# Patient Record
Sex: FEMALE | Race: BLACK OR AFRICAN AMERICAN | NOT HISPANIC OR LATINO | Employment: OTHER | URBAN - METROPOLITAN AREA
[De-identification: names, ages, dates, MRNs, and addresses within clinical notes are randomized per-mention and may not be internally consistent; named-entity substitution may affect disease eponyms.]

---

## 2019-09-25 ENCOUNTER — HOSPITAL ENCOUNTER (INPATIENT)
Facility: HOSPITAL | Age: 84
LOS: 3 days | Discharge: HOME HEALTH | DRG: 482 | End: 2019-09-28
Attending: EMERGENCY MEDICINE | Admitting: INTERNAL MEDICINE
Payer: MEDICARE

## 2019-09-25 DIAGNOSIS — S72.001A CLOSED FRACTURE OF RIGHT HIP, INITIAL ENCOUNTER: Primary | ICD-10-CM

## 2019-09-25 DIAGNOSIS — S72.144A CLOSED NONDISPLACED INTERTROCHANTERIC FRACTURE OF RIGHT FEMUR, INITIAL ENCOUNTER: ICD-10-CM

## 2019-09-25 DIAGNOSIS — T14.90XA TRAUMA: ICD-10-CM

## 2019-09-25 DIAGNOSIS — Z01.818 PRE-OP EVALUATION: ICD-10-CM

## 2019-09-25 PROBLEM — Z72.0 TOBACCO ABUSE: Status: ACTIVE | Noted: 2019-09-25

## 2019-09-25 PROBLEM — S72.141A CLOSED INTERTROCHANTERIC FRACTURE OF RIGHT HIP: Status: ACTIVE | Noted: 2019-09-25

## 2019-09-25 PROBLEM — Z72.0 TOBACCO ABUSE: Chronic | Status: ACTIVE | Noted: 2019-09-25

## 2019-09-25 LAB
ALBUMIN SERPL-MCNC: 4.1 G/DL (ref 3.3–5.5)
ALP SERPL-CCNC: 60 U/L (ref 42–141)
BILIRUB SERPL-MCNC: 0.8 MG/DL (ref 0.2–1.6)
BILIRUBIN, POC UA: NEGATIVE
BLOOD, POC UA: ABNORMAL
BUN SERPL-MCNC: 13 MG/DL (ref 7–22)
CALCIUM SERPL-MCNC: 9.8 MG/DL (ref 8–10.3)
CHLORIDE SERPL-SCNC: 102 MMOL/L (ref 98–108)
CLARITY, POC UA: CLEAR
COLOR, POC UA: YELLOW
CREAT SERPL-MCNC: 0.6 MG/DL (ref 0.6–1.2)
GLUCOSE SERPL-MCNC: 188 MG/DL (ref 73–118)
GLUCOSE, POC UA: NEGATIVE
KETONES, POC UA: ABNORMAL
LEUKOCYTE EST, POC UA: ABNORMAL
NITRITE, POC UA: NEGATIVE
PH UR STRIP: 5 [PH]
POC ALT (SGPT): 16 U/L (ref 10–47)
POC AST (SGOT): 26 U/L (ref 11–38)
POC PTINR: 1 (ref 0.9–1.2)
POC PTWBT: 12.5 SEC (ref 9.7–14.3)
POC TCO2: 29 MMOL/L (ref 18–33)
POTASSIUM BLD-SCNC: 3.7 MMOL/L (ref 3.6–5.1)
PROTEIN, POC UA: NEGATIVE
PROTEIN, POC: 7.4 G/DL (ref 6.4–8.1)
SAMPLE: NORMAL
SODIUM BLD-SCNC: 144 MMOL/L (ref 128–145)
SPECIFIC GRAVITY, POC UA: >=1.03
UROBILINOGEN, POC UA: 0.2 E.U./DL

## 2019-09-25 PROCEDURE — 80053 COMPREHEN METABOLIC PANEL: CPT | Mod: ER

## 2019-09-25 PROCEDURE — 81003 URINALYSIS AUTO W/O SCOPE: CPT | Mod: ER

## 2019-09-25 PROCEDURE — 93010 ELECTROCARDIOGRAM REPORT: CPT | Mod: ,,, | Performed by: INTERNAL MEDICINE

## 2019-09-25 PROCEDURE — 93005 ELECTROCARDIOGRAM TRACING: CPT | Mod: ER

## 2019-09-25 PROCEDURE — 99285 EMERGENCY DEPT VISIT HI MDM: CPT | Mod: 25,ER

## 2019-09-25 PROCEDURE — 87086 URINE CULTURE/COLONY COUNT: CPT

## 2019-09-25 PROCEDURE — 51702 INSERT TEMP BLADDER CATH: CPT | Mod: ER

## 2019-09-25 PROCEDURE — 63600175 PHARM REV CODE 636 W HCPCS: Mod: ER | Performed by: EMERGENCY MEDICINE

## 2019-09-25 PROCEDURE — 85610 PROTHROMBIN TIME: CPT | Mod: ER

## 2019-09-25 PROCEDURE — 93010 EKG 12-LEAD: ICD-10-PCS | Mod: ,,, | Performed by: INTERNAL MEDICINE

## 2019-09-25 PROCEDURE — 96374 THER/PROPH/DIAG INJ IV PUSH: CPT | Mod: ER

## 2019-09-25 PROCEDURE — 85025 COMPLETE CBC W/AUTO DIFF WBC: CPT | Mod: ER

## 2019-09-25 PROCEDURE — 11000001 HC ACUTE MED/SURG PRIVATE ROOM

## 2019-09-25 PROCEDURE — 96375 TX/PRO/DX INJ NEW DRUG ADDON: CPT | Mod: ER

## 2019-09-25 RX ORDER — ACETAMINOPHEN 500 MG
500 TABLET ORAL EVERY 6 HOURS PRN
Status: DISCONTINUED | OUTPATIENT
Start: 2019-09-25 | End: 2019-09-28 | Stop reason: HOSPADM

## 2019-09-25 RX ORDER — ENOXAPARIN SODIUM 100 MG/ML
40 INJECTION SUBCUTANEOUS EVERY 24 HOURS
Status: DISCONTINUED | OUTPATIENT
Start: 2019-09-26 | End: 2019-09-26

## 2019-09-25 RX ORDER — MORPHINE SULFATE 8 MG/ML
2 INJECTION INTRAMUSCULAR; INTRAVENOUS; SUBCUTANEOUS
Status: COMPLETED | OUTPATIENT
Start: 2019-09-25 | End: 2019-09-25

## 2019-09-25 RX ORDER — MULTIVITAMIN
1 TABLET ORAL DAILY
COMMUNITY

## 2019-09-25 RX ORDER — RAMELTEON 8 MG/1
8 TABLET ORAL NIGHTLY PRN
Status: DISCONTINUED | OUTPATIENT
Start: 2019-09-25 | End: 2019-09-27

## 2019-09-25 RX ORDER — IBUPROFEN 200 MG
1 TABLET ORAL DAILY
Status: DISCONTINUED | OUTPATIENT
Start: 2019-09-26 | End: 2019-09-28 | Stop reason: HOSPADM

## 2019-09-25 RX ORDER — AMOXICILLIN 250 MG
1 CAPSULE ORAL 2 TIMES DAILY
Status: DISCONTINUED | OUTPATIENT
Start: 2019-09-25 | End: 2019-09-28 | Stop reason: HOSPADM

## 2019-09-25 RX ORDER — ONDANSETRON 2 MG/ML
4 INJECTION INTRAMUSCULAR; INTRAVENOUS
Status: COMPLETED | OUTPATIENT
Start: 2019-09-25 | End: 2019-09-25

## 2019-09-25 RX ORDER — SODIUM CHLORIDE 0.9 % (FLUSH) 0.9 %
10 SYRINGE (ML) INJECTION
Status: DISCONTINUED | OUTPATIENT
Start: 2019-09-25 | End: 2019-09-25

## 2019-09-25 RX ORDER — OXYCODONE HYDROCHLORIDE 5 MG/1
5 TABLET ORAL EVERY 6 HOURS PRN
Status: DISCONTINUED | OUTPATIENT
Start: 2019-09-25 | End: 2019-09-26

## 2019-09-25 RX ORDER — DONEPEZIL HYDROCHLORIDE 10 MG/1
10 TABLET, FILM COATED ORAL DAILY
Status: DISCONTINUED | OUTPATIENT
Start: 2019-09-26 | End: 2019-09-25

## 2019-09-25 RX ORDER — ONDANSETRON 2 MG/ML
8 INJECTION INTRAMUSCULAR; INTRAVENOUS EVERY 8 HOURS PRN
Status: DISCONTINUED | OUTPATIENT
Start: 2019-09-25 | End: 2019-09-26

## 2019-09-25 RX ADMIN — ONDANSETRON 4 MG: 2 INJECTION INTRAMUSCULAR; INTRAVENOUS at 06:09

## 2019-09-25 RX ADMIN — MORPHINE SULFATE 2 MG: 8 INJECTION, SOLUTION INTRAMUSCULAR; INTRAVENOUS at 06:09

## 2019-09-25 RX ADMIN — CEFTRIAXONE 2 G: 2 INJECTION, SOLUTION INTRAVENOUS at 06:09

## 2019-09-25 NOTE — ED PROVIDER NOTES
Encounter Date: 9/25/2019    SCRIBE #1 NOTE: I, Lorena Bonner, am scribing for, and in the presence of,  Dr. Deluca. I have scribed the following portions of the note - Other sections scribed: HPI, ROS, PE.       History     Chief Complaint   Patient presents with    Fall     pt with h/o dementia fell at home coming out of bathroom, pts daughter heard fall and stated pt was alert when she assisted her up. She noticed that pt did not want to put weight on left foot.  pt denies pain       History provided by daughter and limited secondary to dementia.     Lakisha Padilla is an 87 year old female with dementia who presents to the ED with daughter. Daugther states patient hit the edge of a bathroom door before falling onto her left side. Daughter states fall occurred earlier today. Fall was not witnessed, but daughter heard patient fall. Patient was found alert and lying on the ground on her left side. Daughter denies LOC or head injury. Patient needed assistance getting up and daughter noticed she wouldn't put weight on her right leg. EMS were on the seen and patient was transported to [facility] ED. The daughter reports that she waited for several hours but patient was not seen so she brought her to this ER.  Patient is not on blood thinners or any other daily medications.           The history is provided by a relative.     Review of patient's allergies indicates:  No Known Allergies  Past Medical History:   Diagnosis Date    Dementia in Alzheimer's disease     Hypovitaminosis D      Past Surgical History:   Procedure Laterality Date    HYSTERECTOMY      Total     Family History   Problem Relation Age of Onset    Diabetes Sister      Social History     Tobacco Use    Smoking status: Current Every Day Smoker   Substance Use Topics    Alcohol use: Yes     Comment: occasional    Drug use: Not on file     Review of Systems   Unable to perform ROS: Dementia       Physical Exam     Initial Vitals [09/25/19 1604]    BP Pulse Resp Temp SpO2   (!) 138/93 77 16 97.5 °F (36.4 °C) 98 %      MAP       --         Nursing note and vitals reviewed.  Constitutional: She appears well-developed.   HENT:   Head: Normocephalic.   Eyes: Conjunctivae are normal.   Neck: Neck supple.   Cardiovascular: Normal rate and intact distal pulses.   Pulmonary/Chest: No respiratory distress.   Abdominal: Soft. There is no tenderness.   Musculoskeletal:        Right hip: She exhibits decreased range of motion.   Shortened minimal rotated right hip with decreased range of motion.   Neurological:   Alert and oriented to self.   Skin: Skin is warm and dry.     ED Course   Procedures  Labs Reviewed   POCT URINALYSIS W/O SCOPE - Abnormal; Notable for the following components:       Result Value    Ketones, UA 1+ (*)     Spec Grav UA >=1.030 (*)     Blood, UA 1+ (*)     Leukocytes, UA 1+ (*)     All other components within normal limits   POCT CMP - Abnormal; Notable for the following components:    POC Glucose 188 (*)     All other components within normal limits   CULTURE, URINE   POCT CBC   POCT URINALYSIS W/O SCOPE   POCT CMP   POCT PROTIME-INR   ISTAT PROCEDURE     EKG Readings: (Independently Interpreted)   Time seen: 9/25/2019 17:29  Rate 94 bpm  Normal sinus rhythm. Non-specific T wave abnormalities.  No priors to compare it to.       Imaging Results          X-Ray Chest 1 View (Final result)  Result time 09/25/19 17:43:40    Final result by Patrice Veloz MD (09/25/19 17:43:40)                 Impression:      Pulmonary hyperinflation suggesting sequela of underlying COPD/emphysema without large consolidation.      Electronically signed by: Patrice Veloz MD  Date:    09/25/2019  Time:    17:43             Narrative:    EXAMINATION:  XR CHEST 1 VIEW    CLINICAL HISTORY:  Encounter for other preprocedural examination    TECHNIQUE:  Single frontal view of the chest was performed.    COMPARISON:  Left humerus and shoulder series  12/06/2016    FINDINGS:  Skin folds project over the left hemithorax.  Patient is slightly rotated.    Heart is not enlarged.  There is calcific atherosclerosis and tortuosity of the thoracic aorta.  Scattered linear opacities throughout both lungs consistent with subsegmental scarring versus atelectasis.  The lungs are symmetrically hyperexpanded without focal consolidation, pleural effusion or pneumothorax.  Pulmonary vasculature and hilar regions are within normal limits.  Marked levocurvature of the thoracolumbar spine.  No acute osseous process seen.  PA and lateral views can be obtained.                               X-Ray Hip 2 View Right (Final result)  Result time 09/25/19 17:16:25    Final result by Amna Soler MD (09/25/19 17:16:25)                 Impression:      Acute intertrochanteric right proximal femur fracture.      Electronically signed by: Amna Soler MD  Date:    09/25/2019  Time:    17:16             Narrative:    EXAMINATION:  XR FEMUR 2 VIEW RIGHT; XR KNEE 1 OR 2 VIEW RIGHT; XR HIP 2 VIEW RIGHT; XR PELVIS ROUTINE AP    CLINICAL HISTORY:  trauma;Injury, unspecified, initial encounter    TECHNIQUE:  AP and lateral views of the right femur were performed.  Right hip AP and lateral.  Right pelvis AP.  Right knee AP and lateral.    COMPARISON:  None    FINDINGS:  Acute intertrochanteric right proximal femur fracture is seen.  There is mild comminution and mild proximal migration of the main distal femur fracture component.  No additional acute displaced fractures or dislocation seen.  Degenerative changes and joint space narrowing are seen involving the right hip and right knee.  This most prominently involves the lateral tibiofemoral compartment.  No significant suprapatellar joint effusion.                               X-Ray Pelvis Routine AP (Final result)  Result time 09/25/19 17:16:25    Final result by Amna Soler MD (09/25/19 17:16:25)                 Impression:      Acute  intertrochanteric right proximal femur fracture.      Electronically signed by: Amna Soler MD  Date:    09/25/2019  Time:    17:16             Narrative:    EXAMINATION:  XR FEMUR 2 VIEW RIGHT; XR KNEE 1 OR 2 VIEW RIGHT; XR HIP 2 VIEW RIGHT; XR PELVIS ROUTINE AP    CLINICAL HISTORY:  trauma;Injury, unspecified, initial encounter    TECHNIQUE:  AP and lateral views of the right femur were performed.  Right hip AP and lateral.  Right pelvis AP.  Right knee AP and lateral.    COMPARISON:  None    FINDINGS:  Acute intertrochanteric right proximal femur fracture is seen.  There is mild comminution and mild proximal migration of the main distal femur fracture component.  No additional acute displaced fractures or dislocation seen.  Degenerative changes and joint space narrowing are seen involving the right hip and right knee.  This most prominently involves the lateral tibiofemoral compartment.  No significant suprapatellar joint effusion.                               X-Ray Femur Ap/Lat Right (Final result)  Result time 09/25/19 17:16:25    Final result by Amna Soler MD (09/25/19 17:16:25)                 Impression:      Acute intertrochanteric right proximal femur fracture.      Electronically signed by: Amna Soler MD  Date:    09/25/2019  Time:    17:16             Narrative:    EXAMINATION:  XR FEMUR 2 VIEW RIGHT; XR KNEE 1 OR 2 VIEW RIGHT; XR HIP 2 VIEW RIGHT; XR PELVIS ROUTINE AP    CLINICAL HISTORY:  trauma;Injury, unspecified, initial encounter    TECHNIQUE:  AP and lateral views of the right femur were performed.  Right hip AP and lateral.  Right pelvis AP.  Right knee AP and lateral.    COMPARISON:  None    FINDINGS:  Acute intertrochanteric right proximal femur fracture is seen.  There is mild comminution and mild proximal migration of the main distal femur fracture component.  No additional acute displaced fractures or dislocation seen.  Degenerative changes and joint space narrowing are seen  involving the right hip and right knee.  This most prominently involves the lateral tibiofemoral compartment.  No significant suprapatellar joint effusion.                               X-Ray Knee 1 or 2 View Right (Final result)  Result time 09/25/19 17:16:25   Procedure changed from X-Ray Knee 3 View Right     Final result by Amna Soler MD (09/25/19 17:16:25)                 Impression:      Acute intertrochanteric right proximal femur fracture.      Electronically signed by: Amna Soler MD  Date:    09/25/2019  Time:    17:16             Narrative:    EXAMINATION:  XR FEMUR 2 VIEW RIGHT; XR KNEE 1 OR 2 VIEW RIGHT; XR HIP 2 VIEW RIGHT; XR PELVIS ROUTINE AP    CLINICAL HISTORY:  trauma;Injury, unspecified, initial encounter    TECHNIQUE:  AP and lateral views of the right femur were performed.  Right hip AP and lateral.  Right pelvis AP.  Right knee AP and lateral.    COMPARISON:  None    FINDINGS:  Acute intertrochanteric right proximal femur fracture is seen.  There is mild comminution and mild proximal migration of the main distal femur fracture component.  No additional acute displaced fractures or dislocation seen.  Degenerative changes and joint space narrowing are seen involving the right hip and right knee.  This most prominently involves the lateral tibiofemoral compartment.  No significant suprapatellar joint effusion.                                 Medical Decision Making:   Initial Assessment:   This is an 87 year old female with history of dementia who comes in after fall. Patient on exam has normal vitals. She is alert and oriented to self at prior baseline. She has obvious right hip with deformity and shortening.  Her exam is normal otherwise.  Orders included x-rays of the hip, pelvis, femur and knee.  EKG, CBC, CMP, UA, chest x-ray were also ordered.  She was given a low dose of morphine and Zofran.  Differential Diagnosis:   Pelvic fracture, hip fracture, closed-head injury, cervical  injury, occult infection.  Independently Interpreted Test(s):   I have ordered and independently interpreted X-rays - see summary below.       <> Summary of X-Ray Reading(s): X-rays were independently reviewed by me and were significant for an intertrochanteric hip fracture.  Clinical Tests:   Lab Tests: Ordered and Reviewed  The following lab test(s) were unremarkable: CBC, CMP and Urinalysis       <> Summary of Lab: Labs were reviewed by me and were significant for a white count of 15.  ED Management:  Patient's workup was significant for an intertrochanteric hip fracture.  She had no other signs of trauma.  Orthopedics on call was consulted and case was discussed with Dr. Soler.  He accepted the patient for transfer.  She will be admitted to the hospitalist service. Case was discussed with Dr. Fitz Hathaway who will admit the patient.             Scribe Attestation:   Scribe #1: I performed the above scribed service and the documentation accurately describes the services I performed. I attest to the accuracy of the note.               Clinical Impression:     1. Closed fracture of right hip, initial encounter    2. Trauma    3. Pre-op evaluation            Disposition:   Disposition: Admitted  Condition: Stable                       Ines Deluca MD  09/25/19 [id number]

## 2019-09-26 ENCOUNTER — ANESTHESIA EVENT (OUTPATIENT)
Dept: SURGERY | Facility: HOSPITAL | Age: 84
DRG: 482 | End: 2019-09-26
Payer: MEDICARE

## 2019-09-26 ENCOUNTER — ANESTHESIA (OUTPATIENT)
Dept: SURGERY | Facility: HOSPITAL | Age: 84
DRG: 482 | End: 2019-09-26
Payer: MEDICARE

## 2019-09-26 LAB
ALBUMIN SERPL BCP-MCNC: 3.9 G/DL (ref 3.5–5.2)
ALP SERPL-CCNC: 68 U/L (ref 55–135)
ALT SERPL W/O P-5'-P-CCNC: 11 U/L (ref 10–44)
ANION GAP SERPL CALC-SCNC: 12 MMOL/L (ref 8–16)
AST SERPL-CCNC: 19 U/L (ref 10–40)
BASOPHILS # BLD AUTO: 0.01 K/UL (ref 0–0.2)
BASOPHILS NFR BLD: 0.1 % (ref 0–1.9)
BILIRUB SERPL-MCNC: 0.5 MG/DL (ref 0.1–1)
BNP SERPL-MCNC: 99 PG/ML (ref 0–99)
BUN SERPL-MCNC: 16 MG/DL (ref 8–23)
CALCIUM SERPL-MCNC: 10.1 MG/DL (ref 8.7–10.5)
CHLORIDE SERPL-SCNC: 102 MMOL/L (ref 95–110)
CO2 SERPL-SCNC: 26 MMOL/L (ref 23–29)
CREAT SERPL-MCNC: 0.8 MG/DL (ref 0.5–1.4)
DIFFERENTIAL METHOD: ABNORMAL
EOSINOPHIL # BLD AUTO: 0 K/UL (ref 0–0.5)
EOSINOPHIL NFR BLD: 0 % (ref 0–8)
ERYTHROCYTE [DISTWIDTH] IN BLOOD BY AUTOMATED COUNT: 12.9 % (ref 11.5–14.5)
EST. GFR  (AFRICAN AMERICAN): >60 ML/MIN/1.73 M^2
EST. GFR  (NON AFRICAN AMERICAN): >60 ML/MIN/1.73 M^2
GLUCOSE SERPL-MCNC: 149 MG/DL (ref 70–110)
HCT VFR BLD AUTO: 35.4 % (ref 37–48.5)
HGB BLD-MCNC: 11.7 G/DL (ref 12–16)
LYMPHOCYTES # BLD AUTO: 1.3 K/UL (ref 1–4.8)
LYMPHOCYTES NFR BLD: 9.4 % (ref 18–48)
MAGNESIUM SERPL-MCNC: 1.6 MG/DL (ref 1.6–2.6)
MCH RBC QN AUTO: 30.2 PG (ref 27–31)
MCHC RBC AUTO-ENTMCNC: 33.1 G/DL (ref 32–36)
MCV RBC AUTO: 91 FL (ref 82–98)
MONOCYTES # BLD AUTO: 1 K/UL (ref 0.3–1)
MONOCYTES NFR BLD: 7.2 % (ref 4–15)
NEUTROPHILS # BLD AUTO: 11.2 K/UL (ref 1.8–7.7)
NEUTROPHILS NFR BLD: 83.7 % (ref 38–73)
PHOSPHATE SERPL-MCNC: 3.7 MG/DL (ref 2.7–4.5)
PLATELET # BLD AUTO: 316 K/UL (ref 150–350)
PMV BLD AUTO: 10.1 FL (ref 9.2–12.9)
POTASSIUM SERPL-SCNC: 4.8 MMOL/L (ref 3.5–5.1)
PROT SERPL-MCNC: 7.4 G/DL (ref 6–8.4)
RBC # BLD AUTO: 3.88 M/UL (ref 4–5.4)
SODIUM SERPL-SCNC: 140 MMOL/L (ref 136–145)
WBC # BLD AUTO: 13.4 K/UL (ref 3.9–12.7)

## 2019-09-26 PROCEDURE — 63600175 PHARM REV CODE 636 W HCPCS: Performed by: NURSE ANESTHETIST, CERTIFIED REGISTERED

## 2019-09-26 PROCEDURE — 63600175 PHARM REV CODE 636 W HCPCS: Performed by: ORTHOPAEDIC SURGERY

## 2019-09-26 PROCEDURE — 27201423 OPTIME MED/SURG SUP & DEVICES STERILE SUPPLY: Performed by: ORTHOPAEDIC SURGERY

## 2019-09-26 PROCEDURE — 71000039 HC RECOVERY, EACH ADD'L HOUR: Performed by: ORTHOPAEDIC SURGERY

## 2019-09-26 PROCEDURE — 37000009 HC ANESTHESIA EA ADD 15 MINS: Performed by: ORTHOPAEDIC SURGERY

## 2019-09-26 PROCEDURE — 71000033 HC RECOVERY, INTIAL HOUR: Performed by: ORTHOPAEDIC SURGERY

## 2019-09-26 PROCEDURE — 80053 COMPREHEN METABOLIC PANEL: CPT

## 2019-09-26 PROCEDURE — D9220A PRA ANESTHESIA: Mod: ANES,,, | Performed by: ANESTHESIOLOGY

## 2019-09-26 PROCEDURE — D9220A PRA ANESTHESIA: Mod: CRNA,,, | Performed by: NURSE ANESTHETIST, CERTIFIED REGISTERED

## 2019-09-26 PROCEDURE — C1713 ANCHOR/SCREW BN/BN,TIS/BN: HCPCS | Performed by: ORTHOPAEDIC SURGERY

## 2019-09-26 PROCEDURE — 11000001 HC ACUTE MED/SURG PRIVATE ROOM

## 2019-09-26 PROCEDURE — C1769 GUIDE WIRE: HCPCS | Performed by: ORTHOPAEDIC SURGERY

## 2019-09-26 PROCEDURE — 85025 COMPLETE CBC W/AUTO DIFF WBC: CPT

## 2019-09-26 PROCEDURE — 36000711: Performed by: ORTHOPAEDIC SURGERY

## 2019-09-26 PROCEDURE — S0020 INJECTION, BUPIVICAINE HYDRO: HCPCS | Performed by: ANESTHESIOLOGY

## 2019-09-26 PROCEDURE — 25000003 PHARM REV CODE 250: Performed by: ANESTHESIOLOGY

## 2019-09-26 PROCEDURE — 37000008 HC ANESTHESIA 1ST 15 MINUTES: Performed by: ORTHOPAEDIC SURGERY

## 2019-09-26 PROCEDURE — 83880 ASSAY OF NATRIURETIC PEPTIDE: CPT

## 2019-09-26 PROCEDURE — D9220A PRA ANESTHESIA: ICD-10-PCS | Mod: CRNA,,, | Performed by: NURSE ANESTHETIST, CERTIFIED REGISTERED

## 2019-09-26 PROCEDURE — 36000710: Performed by: ORTHOPAEDIC SURGERY

## 2019-09-26 PROCEDURE — D9220A PRA ANESTHESIA: ICD-10-PCS | Mod: ANES,,, | Performed by: ANESTHESIOLOGY

## 2019-09-26 PROCEDURE — 99900035 HC TECH TIME PER 15 MIN (STAT)

## 2019-09-26 PROCEDURE — 25000003 PHARM REV CODE 250: Performed by: ORTHOPAEDIC SURGERY

## 2019-09-26 PROCEDURE — 36415 COLL VENOUS BLD VENIPUNCTURE: CPT

## 2019-09-26 PROCEDURE — 84100 ASSAY OF PHOSPHORUS: CPT

## 2019-09-26 PROCEDURE — 83735 ASSAY OF MAGNESIUM: CPT

## 2019-09-26 DEVICE — SCREW GAMMA3 10.5 THRD 100MM: Type: IMPLANTABLE DEVICE | Site: HIP | Status: FUNCTIONAL

## 2019-09-26 DEVICE — IMPLANTABLE DEVICE: Type: IMPLANTABLE DEVICE | Site: LEG | Status: FUNCTIONAL

## 2019-09-26 DEVICE — SCREW LOCKING 5.0 X 35MM: Type: IMPLANTABLE DEVICE | Site: HIP | Status: FUNCTIONAL

## 2019-09-26 RX ORDER — LIDOCAINE HCL/PF 100 MG/5ML
SYRINGE (ML) INTRAVENOUS
Status: DISCONTINUED | OUTPATIENT
Start: 2019-09-26 | End: 2019-09-26

## 2019-09-26 RX ORDER — SODIUM CHLORIDE, SODIUM LACTATE, POTASSIUM CHLORIDE, CALCIUM CHLORIDE 600; 310; 30; 20 MG/100ML; MG/100ML; MG/100ML; MG/100ML
INJECTION, SOLUTION INTRAVENOUS CONTINUOUS
Status: DISCONTINUED | OUTPATIENT
Start: 2019-09-26 | End: 2019-09-26

## 2019-09-26 RX ORDER — HYDROCODONE BITARTRATE AND ACETAMINOPHEN 5; 325 MG/1; MG/1
1 TABLET ORAL EVERY 4 HOURS PRN
Status: DISCONTINUED | OUTPATIENT
Start: 2019-09-26 | End: 2019-09-27

## 2019-09-26 RX ORDER — FENTANYL CITRATE 50 UG/ML
25 INJECTION, SOLUTION INTRAMUSCULAR; INTRAVENOUS EVERY 5 MIN PRN
Status: DISCONTINUED | OUTPATIENT
Start: 2019-09-26 | End: 2019-09-26

## 2019-09-26 RX ORDER — OXYCODONE HYDROCHLORIDE 5 MG/1
10 TABLET ORAL EVERY 4 HOURS PRN
Status: DISCONTINUED | OUTPATIENT
Start: 2019-09-26 | End: 2019-09-27

## 2019-09-26 RX ORDER — PROPOFOL 10 MG/ML
VIAL (ML) INTRAVENOUS
Status: DISCONTINUED | OUTPATIENT
Start: 2019-09-26 | End: 2019-09-26

## 2019-09-26 RX ORDER — ENOXAPARIN SODIUM 100 MG/ML
30 INJECTION SUBCUTANEOUS EVERY 24 HOURS
Status: DISCONTINUED | OUTPATIENT
Start: 2019-09-26 | End: 2019-09-28 | Stop reason: HOSPADM

## 2019-09-26 RX ORDER — ACETAMINOPHEN 500 MG
1000 TABLET ORAL EVERY 8 HOURS
Status: DISPENSED | OUTPATIENT
Start: 2019-09-26 | End: 2019-09-27

## 2019-09-26 RX ORDER — BUPIVACAINE HYDROCHLORIDE 5 MG/ML
INJECTION, SOLUTION EPIDURAL; INTRACAUDAL
Status: DISCONTINUED | OUTPATIENT
Start: 2019-09-26 | End: 2019-09-26

## 2019-09-26 RX ORDER — ONDANSETRON 2 MG/ML
4 INJECTION INTRAMUSCULAR; INTRAVENOUS EVERY 12 HOURS PRN
Status: DISCONTINUED | OUTPATIENT
Start: 2019-09-26 | End: 2019-09-28 | Stop reason: HOSPADM

## 2019-09-26 RX ORDER — SODIUM CHLORIDE, SODIUM LACTATE, POTASSIUM CHLORIDE, CALCIUM CHLORIDE 600; 310; 30; 20 MG/100ML; MG/100ML; MG/100ML; MG/100ML
INJECTION, SOLUTION INTRAVENOUS CONTINUOUS
Status: ACTIVE | OUTPATIENT
Start: 2019-09-26 | End: 2019-09-27

## 2019-09-26 RX ORDER — MUPIROCIN 20 MG/G
1 OINTMENT TOPICAL 2 TIMES DAILY
Status: DISCONTINUED | OUTPATIENT
Start: 2019-09-26 | End: 2019-09-28 | Stop reason: HOSPADM

## 2019-09-26 RX ORDER — PHENYLEPHRINE HYDROCHLORIDE 10 MG/ML
INJECTION INTRAVENOUS
Status: DISCONTINUED | OUTPATIENT
Start: 2019-09-26 | End: 2019-09-26

## 2019-09-26 RX ORDER — SODIUM CHLORIDE 0.9 % (FLUSH) 0.9 %
3 SYRINGE (ML) INJECTION
Status: DISCONTINUED | OUTPATIENT
Start: 2019-09-26 | End: 2019-09-26

## 2019-09-26 RX ORDER — HYDROMORPHONE HYDROCHLORIDE 2 MG/ML
0.1 INJECTION, SOLUTION INTRAMUSCULAR; INTRAVENOUS; SUBCUTANEOUS EVERY 5 MIN PRN
Status: DISCONTINUED | OUTPATIENT
Start: 2019-09-26 | End: 2019-09-26

## 2019-09-26 RX ORDER — CEFAZOLIN SODIUM 2 G/50ML
2 SOLUTION INTRAVENOUS
Status: COMPLETED | OUTPATIENT
Start: 2019-09-26 | End: 2019-09-27

## 2019-09-26 RX ORDER — FENTANYL CITRATE 50 UG/ML
INJECTION, SOLUTION INTRAMUSCULAR; INTRAVENOUS
Status: DISCONTINUED | OUTPATIENT
Start: 2019-09-26 | End: 2019-09-26

## 2019-09-26 RX ADMIN — FENTANYL CITRATE 25 MCG: 50 INJECTION INTRAMUSCULAR; INTRAVENOUS at 12:09

## 2019-09-26 RX ADMIN — PHENYLEPHRINE HYDROCHLORIDE 100 MCG: 10 INJECTION INTRAVENOUS at 01:09

## 2019-09-26 RX ADMIN — SODIUM CHLORIDE, SODIUM LACTATE, POTASSIUM CHLORIDE, AND CALCIUM CHLORIDE: .6; .31; .03; .02 INJECTION, SOLUTION INTRAVENOUS at 11:09

## 2019-09-26 RX ADMIN — ACETAMINOPHEN 1000 MG: 500 TABLET ORAL at 09:09

## 2019-09-26 RX ADMIN — PROPOFOL 20 MG: 10 INJECTION, EMULSION INTRAVENOUS at 01:09

## 2019-09-26 RX ADMIN — PROPOFOL 20 MG: 10 INJECTION, EMULSION INTRAVENOUS at 12:09

## 2019-09-26 RX ADMIN — PHENYLEPHRINE HYDROCHLORIDE 100 MCG: 10 INJECTION INTRAVENOUS at 12:09

## 2019-09-26 RX ADMIN — MUPIROCIN 1 G: 20 OINTMENT TOPICAL at 09:09

## 2019-09-26 RX ADMIN — RAMELTEON 8 MG: 8 TABLET, FILM COATED ORAL at 09:09

## 2019-09-26 RX ADMIN — SODIUM CHLORIDE, SODIUM LACTATE, POTASSIUM CHLORIDE, AND CALCIUM CHLORIDE: .6; .31; .03; .02 INJECTION, SOLUTION INTRAVENOUS at 08:09

## 2019-09-26 RX ADMIN — SENNOSIDES, DOCUSATE SODIUM 1 TABLET: 50; 8.6 TABLET, FILM COATED ORAL at 09:09

## 2019-09-26 RX ADMIN — BUPIVACAINE HYDROCHLORIDE 2.5 ML: 5 INJECTION, SOLUTION EPIDURAL; INTRACAUDAL; PERINEURAL at 12:09

## 2019-09-26 RX ADMIN — LIDOCAINE HYDROCHLORIDE 20 MG: 20 INJECTION, SOLUTION INTRAVENOUS at 12:09

## 2019-09-26 RX ADMIN — LIDOCAINE HYDROCHLORIDE 20 MG: 20 INJECTION, SOLUTION INTRAVENOUS at 01:09

## 2019-09-26 RX ADMIN — PHENYLEPHRINE HYDROCHLORIDE 200 MCG: 10 INJECTION INTRAVENOUS at 01:09

## 2019-09-26 RX ADMIN — CEFAZOLIN SODIUM 2 G: 2 SOLUTION INTRAVENOUS at 06:09

## 2019-09-26 NOTE — H&P
[facility] Hospital Medicine  History & Physical    Patient Name: Lakisha Padilla  MRN: [id number]  Admission Date: 9/25/2019  Attending Physician: Fransisca Oshea MD   Primary Care Provider: Primary Doctor No         Patient information was obtained from ER records.     Subjective:     Principal Problem:Closed intertrochanteric fracture of right hip    Chief Complaint: Fall today.    HPI: Ms. Lakihsa Padilla is a 87 y.o. female with dementia and tobacco abuse who presents to [facility] ED with complaints of fall today.  The fall was not witnessed but her daughter heard the patient fall.  She was on the ground upon arrival but has not lost any consciousness or sustain any head injury.  EMS was activated and transferred to [facility] ED.  She was then transferred to this ED because of the long wait time.  Further history is otherwise limited at this time.    Chart Review:  Patient has not had any recent hospitalizations within the system.    Outpatient Follow-Up  Date of Visit Physician Service   Dec 2012 Sue Neal MD Primary Care     Past Medical History:   Diagnosis Date    Dementia in Alzheimer's disease     Hypovitaminosis D        Past Surgical History:   Procedure Laterality Date    HYSTERECTOMY      Total       Review of patient's allergies indicates:  No Known Allergies    No current facility-administered medications on file prior to encounter.      Current Outpatient Medications on File Prior to Encounter   Medication Sig    multivitamin (ONE DAILY MULTIVITAMIN) per tablet Take 1 tablet by mouth once daily.    acetaminophen (TYLENOL) 500 MG tablet Take 1 tablet (500 mg total) by mouth every 6 (six) hours as needed for Pain.    aspirin 81 MG Chew Take 81 mg by mouth once daily.    donepezil (ARICEPT) 10 MG tablet Take 10 mg by mouth once daily.     oxycodone-acetaminophen (PERCOCET) 5-325 mg per tablet Take 1 tablet by mouth every 8 (eight) hours as needed for Pain  (As needed for severe 10 out of 10 pain).     Family History     Problem Relation (Age of Onset)    Diabetes Sister        Tobacco Use    Smoking status: Current Every Day Smoker   Substance and Sexual Activity    Alcohol use: Yes     Comment: occasional    Drug use: Not on file    Sexual activity: Not on file     Review of Systems   Unable to perform ROS: Dementia     Objective:     Vital Signs (Most Recent):  Temp: 98.1 °F (36.7 °C) (09/25/19 2307)  Pulse: 106 (09/25/19 2307)  Resp: 18 (09/25/19 2307)  BP: (!) 144/62 (09/25/19 2307)  SpO2: 100 % (09/25/19 2307) Vital Signs (24h Range):  Temp:  [97.5 °F (36.4 °C)-98.1 °F (36.7 °C)] 98.1 °F (36.7 °C)  Pulse:  [] 106  Resp:  [16-18] 18  SpO2:  [98 %-100 %] 100 %  BP: (138-183)/(62-93) 144/62     Weight: 34.7 kg (76 lb 8 oz)  Body mass index is 15.45 kg/m².    Physical Exam   Constitutional: She is oriented to person, place, and time. She appears well-developed and well-nourished. No distress.   Thin & cachetic   HENT:   Head: Normocephalic and atraumatic.   Right Ear: External ear normal.   Left Ear: External ear normal.   Nose: Nose normal.   Eyes: Right eye exhibits no discharge. Left eye exhibits no discharge.   Neck: Normal range of motion.   Cardiovascular: Normal rate, regular rhythm, normal heart sounds and intact distal pulses. Exam reveals no gallop and no friction rub.   No murmur heard.  Pulmonary/Chest: Effort normal and breath sounds normal. No stridor. No respiratory distress. She has no wheezes. She has no rales. She exhibits no tenderness.   Abdominal: Soft. Bowel sounds are normal. She exhibits no distension. There is no tenderness. There is no rebound and no guarding.   Musculoskeletal:   Right lower extremity in Buck's traction but neurovascularly intact   Neurological: She is alert and oriented to person, place, and time.   Skin: Skin is warm and dry. She is not diaphoretic. No erythema.   Psychiatric: She has a normal mood and affect.  Her behavior is normal. Judgment and thought content normal.   Nursing note and vitals reviewed.          Significant Labs: All pertinent labs within the past 24 hours have been reviewed.    Significant Imaging: I have reviewed and interpreted all pertinent imaging results/findings within the past 24 hours.    Assessment/Plan:     * Closed intertrochanteric fracture of right hip  Patient has sustained a fall and has a plain right hip radiograph significant for an [a]cute intertrochanteric right proximal femur fracture.  Patient is otherwise hemodynamically stable.  Her Revised Cardiac Risk Index suggest that she is at very low risk for perioperative cardiac events.  Will provide supportive care with analgesics support and consult Orthopedic surgery in the morning for further recommendations.    Dementia  Stable; will continue her home regimen of donepezil.    Tobacco abuse  Will provide transdermal nicotine patch.    VTE Risk Mitigation (From admission, onward)         Ordered     enoxaparin injection 40 mg  Daily      09/25/19 2251     IP VTE HIGH RISK PATIENT  Once      09/25/19 2251                     Graciela Houston M.D.  Staff Nocturnist  Department of Hospital Medicine  [facility]  Pager: [phone]          N.B.: Portions of this note was dictated using M*Modal Fluency Direct--there may be voice recognition errors occasionally missed on review.

## 2019-09-26 NOTE — PLAN OF CARE
SW went to complete assessment. Patient being taken to surgery.        09/26/19 1142   Discharge Assessment   Assessment Type Discharge Planning Assessment

## 2019-09-26 NOTE — CONSULTS
Chief Complaint: right hip pain    History of Present Illness:  88 yo old female with a sig pmhx of HTN and dementia presented with a cc of right hip pain following an unwitnessed mechanical fall. She denies LOC, CP, or any other injury.   VSS, except for elevated BP. BPmax 183/93     Hgb: 11.7  Xray right hip: Acute intertrochanteric right proximal femur fracture.     Seen by internal medicine and cleared.  Discussed extensively with daughter at bedside    Review of Systems:    Noncontributory except for above      Past Medical History:   Diagnosis Date    Dementia in Alzheimer's disease     Hypovitaminosis D        Past Surgical History:   Past Surgical History:   Procedure Laterality Date    HYSTERECTOMY      Total       Social History:  negative tobacco abuse    Allergies:  Review of patient's allergies indicates:  No Known Allergies    Medications:  Current Facility-Administered Medications   Medication Dose Route Frequency Provider Last Rate Last Dose    acetaminophen tablet 500 mg  500 mg Oral Q6H PRN Graciela Houston MD        enoxaparin injection 40 mg  40 mg Subcutaneous Daily Graciela Houston MD        lactated ringers infusion   Intravenous Continuous Del Duarte MD 75 mL/hr at 09/26/19 0825      nicotine 21 mg/24 hr 1 patch  1 patch Transdermal Daily Graciela Houston MD        ondansetron injection 8 mg  8 mg Intravenous Q8H PRN Graciela Houston MD        oxyCODONE immediate release tablet 5 mg  5 mg Oral Q6H PRN Graciela Houston MD        promethazine (PHENERGAN) 6.25 mg in dextrose 5 % 50 mL IVPB  6.25 mg Intravenous Q6H PRN Graciela Houston MD        ramelteon tablet 8 mg  8 mg Oral Nightly PRN Graciela Houston MD        senna-docusate 8.6-50 mg per tablet 1 tablet  1 tablet Oral BID Graciela Houston MD           Physical Exam:   General:  Well developed and well nourished age appropriate female in no acute distress  aaox2  Cardiovascular: regular rate and rhythm  Respiratory:  Clear to Auscultation  bilaterally, no wheezing  Abdomen: soft, non-tender, non-distended  Musculoskeletal: ++ pain with ROM of RIGHT hip  Grossly NVI RLE  Warm well perfused  SILT throughout  Neuro: as above    Imaging:  Imaging revealed: RIGHT displaced intertrochanteric femur fracture    Diagnosis:  86 y/o female with dementia and RIGHT intertrochanteric femur fracture    Plan:   1. We discussed both surgical and non surgical options.  Patient would like to proceed with surgical intervention.  Patient understands the inherent risks and benefits and would like to proceed with the following surgical intervention on 9/26/2019.  All consents were signed.    Planned Surgical Intervention:  cephalomedullary nail right femur  2. Aguero  3. No weight bearing  4. Informed consent signed  To OR today for operative fixation

## 2019-09-26 NOTE — SUBJECTIVE & OBJECTIVE
Past Medical History:   Diagnosis Date    Dementia in Alzheimer's disease     Hypovitaminosis D        Past Surgical History:   Procedure Laterality Date    HYSTERECTOMY      Total       Review of patient's allergies indicates:  No Known Allergies    No current facility-administered medications on file prior to encounter.      Current Outpatient Medications on File Prior to Encounter   Medication Sig    multivitamin (ONE DAILY MULTIVITAMIN) per tablet Take 1 tablet by mouth once daily.    acetaminophen (TYLENOL) 500 MG tablet Take 1 tablet (500 mg total) by mouth every 6 (six) hours as needed for Pain.    aspirin 81 MG Chew Take 81 mg by mouth once daily.    donepezil (ARICEPT) 10 MG tablet Take 10 mg by mouth once daily.     oxycodone-acetaminophen (PERCOCET) 5-325 mg per tablet Take 1 tablet by mouth every 8 (eight) hours as needed for Pain (As needed for severe 10 out of 10 pain).     Family History     Problem Relation (Age of Onset)    Diabetes Sister        Tobacco Use    Smoking status: Current Every Day Smoker   Substance and Sexual Activity    Alcohol use: Yes     Comment: occasional    Drug use: Not on file    Sexual activity: Not on file     Review of Systems   Unable to perform ROS: Dementia     Objective:     Vital Signs (Most Recent):  Temp: 98.1 °F (36.7 °C) (09/25/19 2307)  Pulse: 106 (09/25/19 2307)  Resp: 18 (09/25/19 2307)  BP: (!) 144/62 (09/25/19 2307)  SpO2: 100 % (09/25/19 2307) Vital Signs (24h Range):  Temp:  [97.5 °F (36.4 °C)-98.1 °F (36.7 °C)] 98.1 °F (36.7 °C)  Pulse:  [] 106  Resp:  [16-18] 18  SpO2:  [98 %-100 %] 100 %  BP: (138-183)/(62-93) 144/62     Weight: 34.7 kg (76 lb 8 oz)  Body mass index is 15.45 kg/m².    Physical Exam   Constitutional: She is oriented to person, place, and time. She appears well-developed and well-nourished. No distress.   Thin & cachetic   HENT:   Head: Normocephalic and atraumatic.   Right Ear: External ear normal.   Left Ear: External  ear normal.   Nose: Nose normal.   Eyes: Right eye exhibits no discharge. Left eye exhibits no discharge.   Neck: Normal range of motion.   Cardiovascular: Normal rate, regular rhythm, normal heart sounds and intact distal pulses. Exam reveals no gallop and no friction rub.   No murmur heard.  Pulmonary/Chest: Effort normal and breath sounds normal. No stridor. No respiratory distress. She has no wheezes. She has no rales. She exhibits no tenderness.   Abdominal: Soft. Bowel sounds are normal. She exhibits no distension. There is no tenderness. There is no rebound and no guarding.   Musculoskeletal:   Right lower extremity in Hurd's traction but neurovascularly intact   Neurological: She is alert and oriented to person, place, and time.   Skin: Skin is warm and dry. She is not diaphoretic. No erythema.   Psychiatric: She has a normal mood and affect. Her behavior is normal. Judgment and thought content normal.   Nursing note and vitals reviewed.          Significant Labs: All pertinent labs within the past 24 hours have been reviewed.    Significant Imaging: I have reviewed and interpreted all pertinent imaging results/findings within the past 24 hours.

## 2019-09-26 NOTE — CONSULTS
86 yo old female with a sig pmhx of HTN presented with a cc of right hip pain following an unwitnessed mechanical fall. She denies LOC, CP, or any other injury.   VSS, except for elevated BP. BPmax 183/93    Right hip: Skin intact. Right LE in traction. TTP on lateral hip. NVI throughout right LE. DP pulse appreciated.   Hgb: 11.7  Xray right hip: Acute intertrochanteric right proximal femur fracture.    A/P: Right IT femur fx  1) admit to hospital medicine  2) NPO  3) to OR today with Dr Duarte for IM nail right IT femur fx- consents signed and in chart

## 2019-09-26 NOTE — ANESTHESIA PROCEDURE NOTES
Spinal    Diagnosis: right femur fracture  Patient location during procedure: holding area  Start time: 9/26/2019 12:01 PM  Timeout: 9/26/2019 12:01 PM  End time: 9/26/2019 12:14 PM    Staffing  Authorizing Provider: Roberth Zapata MD  Performing Provider: Roberth Zapata MD    Preanesthetic Checklist  Completed: patient identified, site marked, surgical consent, pre-op evaluation, timeout performed, IV checked, risks and benefits discussed and monitors and equipment checked  Spinal Block  Patient position: right lateral decubitus  Prep: ChloraPrep  Patient monitoring: heart rate, cardiac monitor, continuous pulse ox and frequent blood pressure checks  Location: L4-5  Injection technique: single shot  CSF Fluid: clear free-flowing CSF  Needle  Needle type: pencan.   Needle gauge: 25 G  Needle length: 3.5 in  Additional Documentation: negative aspiration for heme and no paresthesia on injection  Needle localization: anatomical landmarks  Assessment  Ease of block: moderate  Patient's tolerance of the procedure: comfortable throughout block and no complaints

## 2019-09-26 NOTE — ASSESSMENT & PLAN NOTE
Patient has sustained a fall and has a plain right hip radiograph significant for an [a]cute intertrochanteric right proximal femur fracture.  Patient is otherwise hemodynamically stable.  Her Revised Cardiac Risk Index suggest that she is at very low risk for perioperative cardiac events.  Will provide supportive care with analgesics support and consult Orthopedic surgery in the morning for further recommendations.

## 2019-09-26 NOTE — NURSING
To surgery via bed with assistance of surg.tech. & accompanied by fly.member. Vickey's traction weight removed & sent  with transporter. Spoke with Marianela in surgery pre-op checklist not done. Surgery consent signed,bath given,& pt. NPO.

## 2019-09-26 NOTE — NURSING
Patient arrived to floor via EMS from [facility]. Oriented patient to room and call light. Admit to follow.

## 2019-09-26 NOTE — PLAN OF CARE
Patient new admit this shift. NPO since midnight for surgery today. Complains of pain when repositioned, medication offered, but refused. Right leg remains in 5lb finn's traction per order. Right leg remains aligned. Daughter remains at bedside. Current plan of care reviewed and continued.

## 2019-09-26 NOTE — ANESTHESIA PREPROCEDURE EVALUATION
"                                                                                                             09/26/2019  Lakisha Padilla is a 87 y.o., female.    Anesthesia Evaluation     I have reviewed the Nursing Notes.      Review of Systems  Social:  Smoker   Cardiovascular:  Cardiovascular Normal     Pulmonary:   Denies Pneumonia Denies Asthma.  Denies Shortness of breath.  Denies Recent URI.  Denies Sleep Apnea.    Renal/:  Renal/ Normal     Hepatic/GI:  Hepatic/GI Normal    Neurological:   Denies CVA. Denies Seizures.  Dementia    Endocrine:  Endocrine Normal        Physical Exam  General:  Cachexia    Airway/Jaw/Neck:  AIRWAY FINDINGS: Normal      Chest/Lungs:  Chest/Lungs Clear    Heart/Vascular:  Heart Findings: Normal       Mental Status:  Pt cannot state year  Pt oriented to self and daughter  When asked "are we are in a school, hospital or [employer]" pt states she knows we are not in a hospital or a school.  Able to answer simple questions about whether she is cold        Anesthesia Plan  Type of Anesthesia, risks & benefits discussed:  Anesthesia Type:  general, spinal  Patient's Preference:   Intra-op Monitoring Plan: standard ASA monitors  Intra-op Monitoring Plan Comments:   Post Op Pain Control Plan:   Post Op Pain Control Plan Comments:   Induction:   IV  Beta Blocker:  Patient is not currently on a Beta-Blocker (No further documentation required).       Informed Consent: Patient representative understands risks and agrees with Anesthesia plan.  Questions answered. Anesthesia consent signed with patient representative.  ASA Score: 3     Day of Surgery Review of History & Physical:  There are no significant changes.  H&P update referred to the provider.     Anesthesia Plan Notes: Note: pt's daughter consented for pt but I didn't realize until afterward that she had signed the patient's name instead of her own name.  The circulating nurse noticed the same thing on the surgery consent so she called " the mother in my presence to verify   That the signatures were hers, and she did.        Ready For Surgery From Anesthesia Perspective.

## 2019-09-26 NOTE — HPI
Ms. Lakisha Padilla is a 87 y.o. female with dementia and tobacco abuse who presents to [facility] ED with complaints of fall today.  The fall was not witnessed but her daughter heard the patient fall.  She was on the ground upon arrival but has not lost any consciousness or sustain any head injury.  EMS was activated and [place].  She was then transferred to this ED because of the long wait time.  Further history is otherwise limited at this time.

## 2019-09-26 NOTE — ED NOTES
Report received, care assumed, pt resting, no s/s of distress noted at this time, awaiting [facility] to transport pt to [facility]

## 2019-09-26 NOTE — BRIEF OP NOTE
Operative Note       Surgery Date: 9/26/2019     Surgeon(s) and Role:     * Del Duarte MD - Primary    Pre-op Diagnosis:  Closed fracture of right hip, initial encounter [S72.001A]    Post-op Diagnosis:  same    Procedure(s) (LRB):  ORIF, FRACTURE, FEMUR, INTERTROCHANTERIC (Right)    Anesthesia: Choice    Findings/Key Components:  11x320 gamma nail  100 lag screw    Core Measure Documentation:  Were antibiotics extended? No  Was the patient administered a VTE Prophylaxis? No. Short procedure; low risk  Estimated Blood Loss: <82251 crystalloid            Specimens (From admission, onward)    None        Implants:   Implant Name Type Inv. Item Serial No.  Lot No. LRB No. Used   GUIDEWIRE 3.2 X 450 - [id number]  GUIDEWIRE 3.2 X 450  [employer]. [id number] Right 1   WIRE HERI T2 [id number] SS - [id number]  WIRE HERI T2 [id number] SS  [employer]. [id number] Right 1   GUIDE WIRE [id number] BALL TIP - [id number]  GUIDE WIRE [id number] BALL TIP  [employer]. [id number] Right 1       Complications: none           Disposition: PACU - hemodynamically stable.           Condition: Stable    Attestation:  I was present for the entire procedure.

## 2019-09-26 NOTE — OP NOTE
09/26/2019    PREOPERATIVE DIAGNOSIS:  Right intertrochanteric hip fracture.    POSTOPERATIVE DIAGNOSIS:  Right intertrochanteric hip fracture.    PROCEDURE:  Right hip IM nailing for intertrochanteric hip fracture. (CPT# 01131)    SURGEON:  Del Duarte M.D.    ASSISTANT:   Toni Elizabeth MD.    ANESTHESIA:  spinal.    ESTIMATED BLOOD LOSS:  <50 mL    INDICATIONS:  The patient is a 87 y.o. who fell previously.  Clinical evaluation was consistent with hip pathology and radiographs revealed an intertrochanteric hip fracture.  She was admitted to the hospital where preoperative medical clearance was obtained and it was recommended at this time to proceed with intramedullary nailing.  Risks and complications were discussed including, but not limited to risks of anesthetic complications, infections, wound healing complications, nonunion, malunion, hardware failure, pain, stiffness, DVT, pulmonary embolism and death among others and she elected to proceed.    DESCRIPTION OF PROCEDURE:  The patient was taken Operating Room where anesthesia was administered by the Anesthesia Department.  She was then placed in the fracture table and all superficial neurovascular structures were well padded.  The right lower extremity was then sterilely prepped and draped in the normal fashion after fluroscopy was used to confirm adequate reduction.   .    A 4 cm longitudinal incision was made just proximal to the greater trochanter.  The subcutaneous tissue and gluteal fascia were incised.  A threaded guide pin was then placed in the tip of the greater trochanter and extended and introduced into the proximal femur under AP and lateral fluoroscopy.  The proximal reamer was then used to ream proximally. A ball-tipped guide desi was then placed through the entry site down to the physeal scar of the femur.  This was measured at 320 mm and then was reamed with sequential reamers to a final width of 12.5 mm reamer.  A Visualead long Gamma3 size  320 mm x 11 mm was then passed over the guidewire, which was subsequently removed.  The proximal interlocking device was then placed and a 3-cm longitudinal incision was made over the lateral aspect of the proximal thigh and the fascia jesús was incised.  A threaded guide pin was then placed through the lateral cortex of the femur through the femoral neck and into the femoral head and measured at 100 mm and over reamed.  A 100 mm proximal screw was then placed under fluoroscopy and satisfactory position was noted on AP and lateral fluoroscopy and the setscrew was then set. The proximal interlocking device was then removed.    Distal interlocking was done with a single lateral to medial interlocking screw through a 1 cm incision under fluoroscopic guidance.  All wounds were then thoroughly irrigated.  Subcutaneous tissues were closed with interrupted inverted of #3-0 Vicryl.  Skin was approximated using skin staples.  Sterile dressing was applied.  Anesthesia was reversed and she was returned to the Postanesthesia Care Unit in stable condition.

## 2019-09-26 NOTE — TRANSFER OF CARE
"Anesthesia Transfer of Care Note    Patient: Lakisha Padilla    Procedure(s) Performed: Procedure(s) (LRB):  ORIF, FRACTURE, FEMUR, INTERTROCHANTERIC (Right)    Patient location: PACU    Anesthesia Type: spinal    Transport from OR: Transported from OR on room air with adequate spontaneous ventilation    Post pain: adequate analgesia    Post assessment: no apparent anesthetic complications and tolerated procedure well    Post vital signs: stable    Level of consciousness: awake and alert    Nausea/Vomiting: no nausea/vomiting    Complications: none    Transfer of care protocol was followed      Last vitals:   Visit Vitals  BP (!) 98/46   Pulse 82   Temp 36.4 °C (97.5 °F) (Oral)   Resp 16   Ht 4' 11" (1.499 m)   Wt 34.7 kg (76 lb 8 oz)   SpO2 (!) 94%   Breastfeeding? No   BMI 15.45 kg/m²     "

## 2019-09-27 LAB
BACTERIA UR CULT: NORMAL
POCT GLUCOSE: 131 MG/DL (ref 70–110)

## 2019-09-27 PROCEDURE — 63600175 PHARM REV CODE 636 W HCPCS: Performed by: ORTHOPAEDIC SURGERY

## 2019-09-27 PROCEDURE — 94761 N-INVAS EAR/PLS OXIMETRY MLT: CPT

## 2019-09-27 PROCEDURE — 25000003 PHARM REV CODE 250: Performed by: ORTHOPAEDIC SURGERY

## 2019-09-27 PROCEDURE — 97166 OT EVAL MOD COMPLEX 45 MIN: CPT

## 2019-09-27 PROCEDURE — S4991 NICOTINE PATCH NONLEGEND: HCPCS | Performed by: ORTHOPAEDIC SURGERY

## 2019-09-27 PROCEDURE — 97530 THERAPEUTIC ACTIVITIES: CPT

## 2019-09-27 PROCEDURE — 11000001 HC ACUTE MED/SURG PRIVATE ROOM

## 2019-09-27 PROCEDURE — 97110 THERAPEUTIC EXERCISES: CPT

## 2019-09-27 PROCEDURE — 97162 PT EVAL MOD COMPLEX 30 MIN: CPT

## 2019-09-27 RX ADMIN — MUPIROCIN 1 G: 20 OINTMENT TOPICAL at 08:09

## 2019-09-27 RX ADMIN — OXYCODONE HYDROCHLORIDE 10 MG: 5 TABLET ORAL at 12:09

## 2019-09-27 RX ADMIN — ENOXAPARIN SODIUM 30 MG: 100 INJECTION SUBCUTANEOUS at 06:09

## 2019-09-27 RX ADMIN — SENNOSIDES, DOCUSATE SODIUM 1 TABLET: 50; 8.6 TABLET, FILM COATED ORAL at 08:09

## 2019-09-27 RX ADMIN — CEFAZOLIN SODIUM 2 G: 2 SOLUTION INTRAVENOUS at 03:09

## 2019-09-27 RX ADMIN — ACETAMINOPHEN 500 MG: 500 TABLET ORAL at 06:09

## 2019-09-27 RX ADMIN — NICOTINE 1 PATCH: 21 PATCH, EXTENDED RELEASE TRANSDERMAL at 08:09

## 2019-09-27 RX ADMIN — ACETAMINOPHEN 500 MG: 500 TABLET ORAL at 10:09

## 2019-09-27 NOTE — PLAN OF CARE
SW informed patient's daughter that PT/OT recommended SNF. Daughter stated that patient will not want to participate in SNF. SW informed  Daughter that patient can also get  home health with PT as an option. Daughter stated that home health would be best.        09/27/19 8082   Post-Acute Status   Post-Acute Authorization Placement  (SNF)   Post-Acute Placement Status Patient/Family Changed Mind

## 2019-09-27 NOTE — PT/OT/SLP EVAL
"Occupational Therapy   Evaluation    Name: Lakisha Padilla  MRN: [id number]  Admitting Diagnosis:  Closed intertrochanteric fracture of right hip 1 Day Post-Op    Recommendations:     Discharge Recommendations: nursing facility, skilled  Discharge Equipment Recommendations:  commode, tub bench, walker, rolling, wheelchair, manual  Barriers to discharge:   pt requires increased amount of care with basic self-care needs     Assessment:     Lakisha Padilla is a 87 y.o. female with a medical diagnosis of Closed intertrochanteric fracture of right hip.  She presents as pleasantly confused/cooperative and able to re-direct with max prompting. Performance deficits affecting function: weakness, impaired self care skills, impaired balance, decreased coordination, decreased safety awareness, decreased ROM, impaired skin, impaired endurance, impaired functional mobilty, decreased upper extremity function, pain, gait instability, impaired cognition, decreased lower extremity function, orthopedic precautions, edema.  Pt was independent PTA completing all ADLs/all aspects of functional mobility without any AD/ supervision (d/t dementia) and now is only able to walk ~6ft with MOD/MAX A with pediatric RW. Pt will benefit from SNF at d/c in order to regain PLOF and reduce risk of falls/readmission.     Rehab Prognosis: Good; patient would benefit from acute skilled OT services to address these deficits and reach maximum level of function.       Plan:     Patient to be seen 6 x/week to address the above listed problems via self-care/home management, therapeutic activities, therapeutic exercises  · Plan of Care Expires: 10/18/19  · Plan of Care Reviewed with: patient, daughter    Subjective     Chief Complaint: pain in her R hip d/t "bad arthritis"   Patient/Family Comments/goals: daughter open to therapy needs at d/c; pt agreeable to sit up in the chair at end of therapy     Occupational Profile:  Living Environment: Pt lives with her dtr " in a SS house with no steps at entry. Bathroom set-up: tub/shower combo.   Previous level of function: Pt was mod I with most ADLs (SUP with bathing d/t age/dtr has fear of her falling, set-up for dressing). Pt completed functional mobility without any AD- no falls within the last 6 months prior to the fall resulting in this admission.   Roles and Routines: pt [place] walking around [place] mall with daughter, maintains an active lifestyle   Equipment Used at Home:  none  Assistance upon Discharge: daughter     Pain/Comfort:  · Pain Rating 1: (c/o RLE pain mostly with movement)  · Location - Side 1: Right  · Location 1: leg  · Pain Addressed 1: Pre-medicate for activity, Cessation of Activity, Reposition, Nurse notified, Distraction  · Pain Rating Post-Intervention 1: (decreased at rest)    Patients cultural, spiritual, Religion conflicts given the current situation: no    Objective:     Communicated with: nurseHeidi, prior to session.  Patient found HOB elevated with bed alarm, telemetry, peripheral IV(Avasys) upon OT entry to room.    General Precautions: Standard, fall   Orthopedic Precautions:RLE weight bearing as tolerated   Braces: N/A     Occupational Performance:    Bed Mobility:    · Patient completed Rolling/Turning to Right with maximal assistance  · Patient completed Scooting/Bridging with maximal assistance  · Patient completed Supine to Sit with maximal assistance and HOB elevated    Functional Mobility/Transfers:  · Patient completed Sit <> Stand Transfer with maximal assistance  with  pediatric rolling walker   · Patient completed Bed <> Chair Transfer using Step Transfer technique with moderate/maximal assistance with pediatric rolling walker  · Functional Mobility: Pt ambulate ~6ft with pediatric RW. Pt required cueing to re-direct to focus on task- pt easily re-directed with max prompting. Please refer to PT for gait.     Activities of Daily Living:  · Feeding:  set-up with cup     · Upper Body  Dressing: maximal assistance to yair back hospital gown (pt required assist for dynamic sitting balance for this functional task d/t posterior & lateral lean with pain)     Cognitive/Visual Perceptual:  Cognitive/Psychosocial Skills:     -       Oriented to: Person and Place   -       Follows Commands/attention:follows one/two step commands with prompting  -       Communication: clear/fluent  -       Memory: Impaired STM, Impaired LTM and Poor immediate recall  -       Safety awareness/insight to disability: impaired   -       Mood/Affect/Coping skills/emotional control: pleasantly confused, cooperative  Visual/Perceptual:      -Intact  acuity and R/L discrimination      Physical Exam:  Balance:    -       sitting: MIN/MOD A; static standing: MOD A with RW; dynamic standing: MAX A with RW   Postural examination/scapula alignment:    -       Rounded shoulders  -       Forward head  -       frail   Skin integrity: Visible skin intact and dressing dry and intact  Edema:  no BUE edema noted  Sensation:    -       Intact  light/touch BUE  Dominant hand:    -       Right  Upper Extremity Range of Motion:     -       Right Upper Extremity: WFL  -       Left Upper Extremity: WFL  Upper Extremity Strength:    -       Right Upper Extremity: WFL  -       Left Upper Extremity: WFL   Strength:    -       Right Upper Extremity: WFL  -       Left Upper Extremity: WFL  Fine Motor Coordination: NT  Gross motor coordination:   impaired 2* pain and confusion     AMPAC 6 Click ADL:  AMPAC Total Score: 13    Treatment & Education:  Pt and daughter educated on OT role/POC.   Importance of OOB activity with staff assistance.  Importance of sitting up in the chair with staff assistance   Safety during functional t/f and mobility   White board updated   Multiple self-care tasks/functional mobility completed- assistance level noted above   All questions/concerns answered within OT scope of practice     Education:    Patient left up in  chair with all lines intact, call button in reach, chair alarm on, nurse, Heidi, and charge nurse notified and [employer] present (daughter had stepped out)     GOALS:   Multidisciplinary Problems     Occupational Therapy Goals        Problem: Occupational Therapy Goal    Goal Priority Disciplines Outcome Interventions   Occupational Therapy Goal     OT, PT/OT Ongoing, Progressing    Description:  Goals to be met by: 10/18/19     Patient will increase functional independence with ADLs by performing:    UE Dressing with Stand-by Assistance.  LE Dressing with Moderate Assistance.  Grooming while seated with Stand-by Assistance.  Toileting from bedside commode with Minimal Assistance for hygiene and clothing management.   Sitting at edge of bed x10 minutes with Stand-by Assistance.  Rolling to Bilateral with Minimal Assistance.   Supine to sit with Minimal Assistance.  Step transfer with Minimal Assistance  Toilet transfer to bedside commode with Moderate Assistance.  Upper extremity exercise program x10 reps per handout, with assistance as needed.                      History:     Past Medical History:   Diagnosis Date    Dementia in Alzheimer's disease     Hypovitaminosis D        Past Surgical History:   Procedure Laterality Date    HYSTERECTOMY      Total       Time Tracking:     OT Date of Treatment: 09/27/19  OT Start Time: 1123  OT Stop Time: 1153  OT Total Time (min): 30 min    Billable Minutes:Evaluation 20 min   Total Time 30 min (co-eval with PT)    [place] Roberto OT  9/27/2019

## 2019-09-27 NOTE — PROGRESS NOTES
[facility] Hospital Medicine  Progress Note    Patient Name: Lakisha Padilla  MRN: [id number]  Patient Class: IP- Inpatient   Admission Date: 9/25/2019  Length of Stay: 2 days  Attending Physician: Ashanti Green MD  Primary Care Provider: Ladan Madera MD        Subjective:     Principal Problem:Closed intertrochanteric fracture of right hip        HPI:  Ms. Lakisha Padilla is a 87 y.o. female with dementia and tobacco abuse who presents to [facility] ED with complaints of fall today.  The fall was not witnessed but her daughter heard the patient fall.  She was on the ground upon arrival but has not lost any consciousness or sustain any head injury.  EMS was activated and transferred to [facility] ED.  She was then transferred to this ED because of the long wait time.  Further history is otherwise limited at this time.    Overview/Hospital Course:  Ms. Padilla presented after sustaining a fall at home.  Imaging remarkable for closed intertrochanteric fracture of the right hip.  Ortho consulted.  Patient underwent right hip IM nailing for intertrochanteric hip fracture on 9/26/19.  PT and OT consulted with recommendation for SN.  Social service working on placement.    Interval History:  Patient without complaint of pain. Family at the bedside reports she has a little bit more confused than her baseline which they attributed to her not eating when she needs to eat.    Review of Systems   Constitutional: Negative for chills and fever.   Respiratory: Negative for shortness of breath.    Cardiovascular: Negative for leg swelling.     Objective:     Vital Signs (Most Recent):  Temp: 98 °F (36.7 °C) (09/27/19 1643)  Pulse: 96 (09/27/19 1643)  Resp: 18 (09/27/19 1643)  BP: (!) 115/53 (09/27/19 1643)  SpO2: 99 % (09/27/19 1643) Vital Signs (24h Range):  Temp:  [98 °F (36.7 °C)-98.2 °F (36.8 °C)] 98 °F (36.7 °C)  Pulse:  [] 96  Resp:  [16-19] 18  SpO2:  [93 %-99 %] 99 %  BP:  (115-144)/(53-89) 115/53     Weight: 34.7 kg (76 lb 8 oz)  Body mass index is 15.45 kg/m².    Intake/Output Summary (Last 24 hours) at 9/27/2019 1748  Last data filed at 9/27/2019 1210  Gross per 24 hour   Intake 480 ml   Output 500 ml   Net -20 ml      Physical Exam   Constitutional: She appears well-developed and well-nourished. No distress.   Thin & cachetic   HENT:   Head: Normocephalic and atraumatic.   Right Ear: External ear normal.   Left Ear: External ear normal.   Nose: Nose normal.   Eyes: Right eye exhibits no discharge. Left eye exhibits no discharge.   Neck: Normal range of motion.   Cardiovascular: Normal rate, regular rhythm, normal heart sounds and intact distal pulses. Exam reveals no gallop and no friction rub.   No murmur heard.  Pulmonary/Chest: Effort normal and breath sounds normal. No stridor. No respiratory distress. She has no wheezes. She has no rales. She exhibits no tenderness.   Abdominal: Soft. Bowel sounds are normal. She exhibits no distension. There is no tenderness. There is no rebound and no guarding.   Musculoskeletal:   Right lower extremity surgical site clean, dry, intact   Neurological: She is alert.   Oriented x2, little more confused than yesterday, no focal deficits appreciated.   Skin: Skin is warm and dry. She is not diaphoretic. No erythema.   Psychiatric: She has a normal mood and affect. Her behavior is normal. Judgment and thought content normal.   Nursing note and vitals reviewed.      Significant Labs: All pertinent labs within the past 24 hours have been reviewed.    Significant Imaging: I have reviewed and interpreted all pertinent imaging results/findings within the past 24 hours.      Assessment/Plan:      * Closed intertrochanteric fracture of right hip  Patient has sustained a fall at home   Plain right hip radiograph significant for an [a]cute intertrochanteric right proximal femur fracture.   Her Revised Cardiac Risk Index was low risk for perioperative  cardiac events.    Patient underwent right hip IM nailing for intertrochanteric hip fracture on 9/26/19  PT and OT, pain control and appropriate prophylaxis  Discontinue all opioids and pain control achieved with Tylenol  SNF recommended therapy, PPD placed   for discharge discharge planning with SNF    Tobacco abuse  Transdermal nicotine patch.    Dementia  Continue her home regimen of donepezil  Slight increasing confusion as expected postop along with unfamiliar environment/people  Patient is not having complaints of pain  Will discontinue all opioids which may exacerbate her confusion      VTE Risk Mitigation (From admission, onward)         Ordered     enoxaparin injection 30 mg  Daily      09/26/19 1738     IP VTE HIGH RISK PATIENT  Once      09/25/19 6478                      Ashanti Green MD  Department of Hospital Medicine   [facility]

## 2019-09-27 NOTE — PLAN OF CARE
Problem: Occupational Therapy Goal  Goal: Occupational Therapy Goal  Description  Goals to be met by: 10/18/19     Patient will increase functional independence with ADLs by performing:    UE Dressing with Stand-by Assistance.  LE Dressing with Moderate Assistance.  Grooming while seated with Stand-by Assistance.  Toileting from bedside commode with Minimal Assistance for hygiene and clothing management.   Sitting at edge of bed x10 minutes with Stand-by Assistance.  Rolling to Bilateral with Minimal Assistance.   Supine to sit with Minimal Assistance.  Step transfer with Minimal Assistance  Toilet transfer to bedside commode with Moderate Assistance.  Upper extremity exercise program x10 reps per handout, with assistance as needed.     Outcome: Ongoing, Progressing  Pt will continue to benefit from acute OT in order to increase safety awareness and independence with ADLs and all aspects of functional mobility. OT rec. SNF at d/c to regain PLOF and reduce risk of falls.

## 2019-09-27 NOTE — NURSING
Notified [facility] call center  moved marylou from room 441 to room 420, alma/g nurse, oncoming nurse  TATA fernandez & Heidi/ day nurse notified. Patient was trying to get OOB. Bed alarm on.  Verbalized understanding.

## 2019-09-27 NOTE — PLAN OF CARE
Monitored freq.throughout the shift. Pt.resting at present with no acute distress noted. Orif rt.femur done earlier with 3 surgical aquacel drsg.to rt.lat thigh/hip area remain intact & dry. Iv fluids & antibiotic cont.as ordered. Aguero catheter remains intact.Bed alarm remains activated.Call light in reach.

## 2019-09-27 NOTE — SUBJECTIVE & OBJECTIVE
Interval History:  Patient without complaint of pain. Family at the bedside reports she has a little bit more confused than her baseline which they attributed to her not eating when she needs to eat.    Review of Systems   Constitutional: Negative for chills and fever.   Respiratory: Negative for shortness of breath.    Cardiovascular: Negative for leg swelling.     Objective:     Vital Signs (Most Recent):  Temp: 98 °F (36.7 °C) (09/27/19 1643)  Pulse: 96 (09/27/19 1643)  Resp: 18 (09/27/19 1643)  BP: (!) 115/53 (09/27/19 1643)  SpO2: 99 % (09/27/19 1643) Vital Signs (24h Range):  Temp:  [98 °F (36.7 °C)-98.2 °F (36.8 °C)] 98 °F (36.7 °C)  Pulse:  [] 96  Resp:  [16-19] 18  SpO2:  [93 %-99 %] 99 %  BP: (115-144)/(53-89) 115/53     Weight: 34.7 kg (76 lb 8 oz)  Body mass index is 15.45 kg/m².    Intake/Output Summary (Last 24 hours) at 9/27/2019 1748  Last data filed at 9/27/2019 1210  Gross per 24 hour   Intake 480 ml   Output 500 ml   Net -20 ml      Physical Exam   Constitutional: She appears well-developed and well-nourished. No distress.   Thin & cachetic   HENT:   Head: Normocephalic and atraumatic.   Right Ear: External ear normal.   Left Ear: External ear normal.   Nose: Nose normal.   Eyes: Right eye exhibits no discharge. Left eye exhibits no discharge.   Neck: Normal range of motion.   Cardiovascular: Normal rate, regular rhythm, normal heart sounds and intact distal pulses. Exam reveals no gallop and no friction rub.   No murmur heard.  Pulmonary/Chest: Effort normal and breath sounds normal. No stridor. No respiratory distress. She has no wheezes. She has no rales. She exhibits no tenderness.   Abdominal: Soft. Bowel sounds are normal. She exhibits no distension. There is no tenderness. There is no rebound and no guarding.   Musculoskeletal:   Right lower extremity surgical site clean, dry, intact   Neurological: She is alert.   Oriented x2, little more confused than yesterday, no focal deficits  appreciated.   Skin: Skin is warm and dry. She is not diaphoretic. No erythema.   Psychiatric: She has a normal mood and affect. Her behavior is normal. Judgment and thought content normal.   Nursing note and vitals reviewed.      Significant Labs: All pertinent labs within the past 24 hours have been reviewed.    Significant Imaging: I have reviewed and interpreted all pertinent imaging results/findings within the past 24 hours.

## 2019-09-27 NOTE — ASSESSMENT & PLAN NOTE
Patient has sustained a fall at home   Plain right hip radiograph significant for an [a]cute intertrochanteric right proximal femur fracture.   Her Revised Cardiac Risk Index was low risk for perioperative cardiac events.    Patient underwent right hip IM nailing for intertrochanteric hip fracture on 9/26/19  PT and OT, pain control and appropriate prophylaxis  Discontinue all opioids and pain control achieved with Tylenol  SNF recommended therapy, PPD placed   for discharge discharge planning with SNF

## 2019-09-27 NOTE — PLAN OF CARE
Problem: Fall Injury Risk  Goal: Absence of Fall and Fall-Related Injury  Outcome: Ongoing, Progressing     Problem: Infection  Goal: Infection Symptom Resolution  Outcome: Ongoing, Progressing     Problem: Adult Inpatient Plan of Care  Goal: Plan of Care Review  Outcome: Ongoing, Progressing  Goal: Patient-Specific Goal (Individualization)  Outcome: Ongoing, Progressing  Goal: Absence of Hospital-Acquired Illness or Injury  Outcome: Ongoing, Progressing  Goal: Optimal Comfort and Wellbeing  Outcome: Ongoing, Progressing  Goal: Readiness for Transition of Care  Outcome: Ongoing, Progressing  Goal: Rounds/Family Conference  Outcome: Ongoing, Progressing     Problem: Skin Injury Risk Increased  Goal: Skin Health and Integrity  Outcome: Ongoing, Progressing     Problem: Bleeding (Orthopaedic Fracture)  Goal: Absence of Bleeding  Outcome: Ongoing, Progressing     Problem: Delayed Union/Nonunion (Orthopaedic Fracture)  Goal: Fracture Stability  Outcome: Ongoing, Progressing     Problem: Embolism (Orthopaedic Fracture)  Goal: Absence of Embolism Signs/Symptoms  Outcome: Ongoing, Progressing     Problem: Functional Ability Impaired (Orthopaedic Fracture)  Goal: Optimal Functional Ability  Outcome: Ongoing, Progressing     Pt pleasantly confused throughout shift, only oriented to person. Achieving adequate pain relief from prescribed meds. Removed Aguero with balloon intact, PIV, and telemetry monitoring. Avasys monitoring initiated. Frequently tried to get out of bed. Frequent rounds made to assess for safety and discomfort, fall precautions maintained. Bed locked in lowest position. Call bell within reach. See corresponding flowsheets for full documentation. Will continue to monitor.

## 2019-09-27 NOTE — PROGRESS NOTES
[facility] Hospital Medicine  Progress Note    Patient Name: Lakisha Padilla  MRN: [id number]  Patient Class: IP- Inpatient   Admission Date: 9/25/2019  Length of Stay: 1 days  Attending Physician: Ashanti Green MD  Primary Care Provider: Primary Doctor No        Subjective:     Principal Problem:Closed intertrochanteric fracture of right hip        HPI:  Ms. Lakisha Padilla is a 87 y.o. female with dementia and tobacco abuse who presents to [facility] ED with complaints of fall today.  The fall was not witnessed but her daughter heard the patient fall.  She was on the ground upon arrival but has not lost any consciousness or sustain any head injury.  EMS was activated and transferred to [facility] ED.  She was then transferred to this ED because of the long wait time.  Further history is otherwise limited at this time.    Overview/Hospital Course:  Ms. Padilla presented after sustaining a fall at home.  Imaging remarkable for closed intertrochanteric fracture of the right hip.  Ortho consulted.  Patient underwent right hip IM nailing for intertrochanteric hip fracture on 9/26/19.    Interval History:  Patient just returned from surgery, has no complaints of pain. She really wants to eat.    Review of Systems   Constitutional: Negative for chills and fever.   Respiratory: Negative for shortness of breath.    Cardiovascular: Negative for leg swelling.     Objective:     Vital Signs (Most Recent):  Temp: 98 °F (36.7 °C) (09/26/19 1955)  Pulse: 97 (09/26/19 1955)  Resp: 19 (09/26/19 1955)  BP: (!) 140/89 (09/26/19 1955)  SpO2: 98 % (09/26/19 1955) Vital Signs (24h Range):  Temp:  [97.5 °F (36.4 °C)-98.3 °F (36.8 °C)] 98 °F (36.7 °C)  Pulse:  [] 97  Resp:  [16-26] 19  SpO2:  [94 %-100 %] 98 %  BP: ()/() 140/89     Weight: 34.7 kg (76 lb 8 oz)  Body mass index is 15.45 kg/m².    Intake/Output Summary (Last 24 hours) at 9/26/2019/2019 2144  Last data filed at 9/26/2019 2100  Gross  per 24 hour   Intake 620 ml   Output 550 ml   Net 70 ml      Physical Exam   Constitutional: She appears well-developed and well-nourished. No distress.   Thin & cachetic   HENT:   Head: Normocephalic and atraumatic.   Right Ear: External ear normal.   Left Ear: External ear normal.   Nose: Nose normal.   Eyes: Right eye exhibits no discharge. Left eye exhibits no discharge.   Neck: Normal range of motion.   Cardiovascular: Normal rate, regular rhythm, normal heart sounds and intact distal pulses. Exam reveals no gallop and no friction rub.   No murmur heard.  Pulmonary/Chest: Effort normal and breath sounds normal. No stridor. No respiratory distress. She has no wheezes. She has no rales. She exhibits no tenderness.   Abdominal: Soft. Bowel sounds are normal. She exhibits no distension. There is no tenderness. There is no rebound and no guarding.   Musculoskeletal:   Right lower extremity surgical site clean, dry, intact   Neurological: She is alert.   Oriented x2, mentation at baseline per family at the bedside   Skin: Skin is warm and dry. She is not diaphoretic. No erythema.   Psychiatric: She has a normal mood and affect. Her behavior is normal. Judgment and thought content normal.   Nursing note and vitals reviewed.      Significant Labs: All pertinent labs within the past 24 hours have been reviewed.    Significant Imaging: I have reviewed and interpreted all pertinent imaging results/findings within the past 24 hours.      Assessment/Plan:      * Closed intertrochanteric fracture of right hip  Patient has sustained a fall   Plain right hip radiograph significant for an [a]cute intertrochanteric right proximal femur fracture.   Her Revised Cardiac Risk Index suggest that she is at very low risk for perioperative cardiac events.    Patient underwent right hip IM nailing for intertrochanteric hip fracture on 9/26/19  Will get PT and OT, pain control and appropriate prophylaxis   for discharge  planning    Tobacco abuse  Will provide transdermal nicotine patch.    Dementia  Stable; will continue her home regimen of donepezil.      VTE Risk Mitigation (From admission, onward)         Ordered     enoxaparin injection 30 mg  Daily      09/26/19 173     IP VTE HIGH RISK PATIENT  Once      09/25/19 3321                      Ashanti Green MD  Department of Hospital Medicine   [facility]

## 2019-09-27 NOTE — ASSESSMENT & PLAN NOTE
Patient has sustained a fall   Plain right hip radiograph significant for an [a]cute intertrochanteric right proximal femur fracture.   Her Revised Cardiac Risk Index suggest that she is at very low risk for perioperative cardiac events.    Patient underwent right hip IM nailing for intertrochanteric hip fracture on 9/26/19  Will get PT and OT, pain control and appropriate prophylaxis   for discharge planning

## 2019-09-27 NOTE — ASSESSMENT & PLAN NOTE
Continue her home regimen of donepezil  Slight increasing confusion as expected postop along with unfamiliar environment/people  Patient is not having complaints of pain  Will discontinue all opioids which may exacerbate her confusion

## 2019-09-27 NOTE — SUBJECTIVE & OBJECTIVE
Interval History:  Patient just returned from surgery, has no complaints of pain. She really wants to eat.    Review of Systems   Constitutional: Negative for chills and fever.   Respiratory: Negative for shortness of breath.    Cardiovascular: Negative for leg swelling.     Objective:     Vital Signs (Most Recent):  Temp: 98 °F (36.7 °C) (09/26/19 1955)  Pulse: 97 (09/26/19 1955)  Resp: 19 (09/26/19 1955)  BP: (!) 140/89 (09/26/19 1955)  SpO2: 98 % (09/26/19 1955) Vital Signs (24h Range):  Temp:  [97.5 °F (36.4 °C)-98.3 °F (36.8 °C)] 98 °F (36.7 °C)  Pulse:  [] 97  Resp:  [16-26] 19  SpO2:  [94 %-100 %] 98 %  BP: ()/() 140/89     Weight: 34.7 kg (76 lb 8 oz)  Body mass index is 15.45 kg/m².    Intake/Output Summary (Last 24 hours) at 9/26/2019 2144  Last data filed at 9/26/2019 2100  Gross per 24 hour   Intake 620 ml   Output 550 ml   Net 70 ml      Physical Exam   Constitutional: She appears well-developed and well-nourished. No distress.   Thin & cachetic   HENT:   Head: Normocephalic and atraumatic.   Right Ear: External ear normal.   Left Ear: External ear normal.   Nose: Nose normal.   Eyes: Right eye exhibits no discharge. Left eye exhibits no discharge.   Neck: Normal range of motion.   Cardiovascular: Normal rate, regular rhythm, normal heart sounds and intact distal pulses. Exam reveals no gallop and no friction rub.   No murmur heard.  Pulmonary/Chest: Effort normal and breath sounds normal. No stridor. No respiratory distress. She has no wheezes. She has no rales. She exhibits no tenderness.   Abdominal: Soft. Bowel sounds are normal. She exhibits no distension. There is no tenderness. There is no rebound and no guarding.   Musculoskeletal:   Right lower extremity surgical site clean, dry, intact   Neurological: She is alert.   Oriented x2, mentation at baseline per family at the bedside   Skin: Skin is warm and dry. She is not diaphoretic. No erythema.   Psychiatric: She has a normal  mood and affect. Her behavior is normal. Judgment and thought content normal.   Nursing note and vitals reviewed.      Significant Labs: All pertinent labs within the past 24 hours have been reviewed.    Significant Imaging: I have reviewed and interpreted all pertinent imaging results/findings within the past 24 hours.

## 2019-09-27 NOTE — PROGRESS NOTES
Orthopedic Postop Progress Note    Postop day: 1 s/p RIGHT IM nail for hip fx    ID: The patient is a 87 y.o. female status post: nurse reports BRANDON, pain controlled this AM, eating breakfast with daughters help    Overnight Events: none    Vitals:    09/27/19 1643   BP: (!) 115/53   Pulse: 96   Resp: 18   Temp: 98 °F (36.7 °C)       Drain Output  09/26 0701 - 09/27 0700  In: 620   Out: 500     Physical Exam:  NAD, A/O x 3.  Wound c/d/i with clean dressing.  No focal motor or sensory deficits noted.    Assessment: The patient is a 87 y.o. female status post: IM nail for right hip fx    Plan:  1) Antibiotics: post op ancef x 24 h  2) Weight bearing status: WBAT  3) Labs: reviewed, acceptable  4) DVT Prophylaxis: lovenox  5) Lines/Drains: piv  6) IV tylenol instead of narcotics

## 2019-09-27 NOTE — PT/OT/SLP EVAL
Physical Therapy Evaluation    Patient Name:  Lakisha Padilla   MRN:  [id number]    Recommendations:     Discharge Recommendations:  nursing facility, skilled; Daughter declined SNF, would like HHPT services.     Discharge Equipment Recommendations: commode, tub bench, walker, rolling, wheelchair, manual   Barriers to discharge home: Pt with decreased mobility.    Assessment:     Lakisha Padilla is a 87 y.o. female admitted with a medical diagnosis of Closed intertrochanteric fracture of right hip.  She presents with the following impairments/functional limitations:  impaired functional mobilty, gait instability, impaired balance, impaired cognition, decreased coordination, decreased lower extremity function, decreased safety awareness, pain, decreased ROM, impaired skin, edema.    Rehab Prognosis: Fair+; patient would benefit from acute skilled PT services to address these deficits and reach maximum level of function.    Recent Surgery: Procedure(s) (LRB):  ORIF, FRACTURE, FEMUR, INTERTROCHANTERIC (Right) 1 Day Post-Op    Plan:     During this hospitalization, patient to be seen daily to address the identified rehab impairments via gait training, therapeutic activities, therapeutic exercises and progress toward the following goals:    · Plan of Care Expires:  10/11/19    Subjective     Chief Complaint: pain  Patient/Family Comments/goals: Family reported that she doesn't know how pt will do with therapy.     Pain/Comfort:  · Pain Rating 1: (Pt with c/o RLE pain mostly with movement.)  · Pain Addressed 1: Pre-medicate for activity, Reposition, Distraction, Cessation of Activity    Living Environment:  Pt lives with daughter in a SS house with no concerns.   Prior to admission, patients level of function was supervision with ambulation without AD.  Equipment used at home: none.  Upon discharge, patient will have assistance from daughter.    Objective:     Communicated with nurse Malone prior to session.  Patient found  HOB elevated with peripheral IV, bed alarm, telemetry(Avasys monitor) upon PT entry to room.    General Precautions: Standard, fall   Orthopedic Precautions:RLE weight bearing as tolerated   Braces: N/A     Exams:  · Cognitive Exam:  Patient is oriented to Person.  Pt recognized daughter, able to state her name on the 2nd trial.  Pt able to state month and day of birthday, unable to state age.  Pt able to follow simple commands.    · Gross Motor Coordination:  impaired  · Postural Exam:  Patient presented with the following abnormalities:    · -       No postural abnormalities identified  · Skin Integrity/Edema:      · -       Skin integrity: Visible skin intact and R hip dressings intact  · -       Edema: Mild RLE  · BLE ROM: WFL, RLE limited 2* pain  · BLE Strength: WFL    Functional Mobility:  Pt pleasantly confused, very talkative and distracted however able to follow some simple commands.  Pt with limited mobility 2* RLE pain.    · Bed Mobility:     · Scooting: maximal assistance  · Supine to Sit: maximal assistance with HOB elevated   · Transfers:     · Sit to Stand:  moderate assistance with rolling walker  · Bed to Chair: maximal assistance with  rolling walker  using  Step Transfer  · Gait: Pt ambulated ~6 ft with mod-max A using RW.  Pt with decreased weight shifting, foot clearance, step length, and ishan.  Pt required max A to weight shift and advance LLE.  Gait limited by pain.  · Balance: Pt with fair balance.       Therapeutic Activities and Exercises:  BLE supine therex 10 reps: AP, HS (AAROM RLE), hip abd/add (AAROM RLE), and SLR (AAROM RLE)    PROM RLE in all available planes    AM-PAC 6 CLICK MOBILITY  Total Score:11     Patient left up in chair on seat cushion reclined with BLE elevated on pillow with all lines intact, call button in reach, chair alarm on, nurse Kira notified and Avasys monitor and daughter present.    GOALS:   Multidisciplinary Problems     Physical Therapy Goals         Problem: Physical Therapy Goal    Goal Priority Disciplines Outcome Goal Variances Interventions   Physical Therapy Goal     PT, PT/OT Ongoing, Progressing     Description:  Goals to be met by: 10/11/19     Patient will increase functional independence with mobility by performin. Supine to sit with MInimal Assistance  2. Rolling to Left and Right with Minimal Assistance  3. Sit to stand transfer with Minimal Assistance using RW  4. Bed to chair transfer with Minimal Assistance using Rolling Walker  5. Gait  x50 feet with Minimal Assistance using Rolling Walker  6. Lower extremity exercise program 2 sets x10 reps per handout, with assistance as needed                      History:     Past Medical History:   Diagnosis Date    Dementia in Alzheimer's disease     Hypovitaminosis D        Past Surgical History:   Procedure Laterality Date    HYSTERECTOMY      Total       Time Tracking:     PT Received On: 19  PT Start Time: 1123     PT Stop Time: 1153  PT Total Time (min): 30 min     Billable Minutes: Evaluation 20 min co-eval with OT and Therapeutic Exercise 10 min    [id number] (10 min - 1 TA)  Pt tolerated ~2 hr up in recliner with seat cushion.  Pt required max A with squat pivot chair>bed transfer.  Pt was max A for sit>supine and dep of 2 persons to reposition HOB.  Pt left HOB elevated and B heels offloaded, call light within reach, bed alarm on, Avasys monitor and daughter present.  Daughter declined SNF placement, reported that pt will not do well if at SNF for 2 weeks.  Daughter reported that she used to be in health care and worked as an aide.  Daughter stated that she will be able to care for pt.  Daughter educated on DME's for pt.  Daughter verbalized good understanding.      Janae Lockwood, PT  2019

## 2019-09-27 NOTE — PLAN OF CARE
Problem: Physical Therapy Goal  Goal: Physical Therapy Goal  Description  Goals to be met by: 10/11/19     Patient will increase functional independence with mobility by performin. Supine to sit with MInimal Assistance  2. Rolling to Left and Right with Minimal Assistance  3. Sit to stand transfer with Minimal Assistance using RW  4. Bed to chair transfer with Minimal Assistance using Rolling Walker  5. Gait  x50 feet with Minimal Assistance using Rolling Walker  6. Lower extremity exercise program 2 sets x10 reps per handout, with assistance as needed     Outcome: Ongoing, Progressing     Pt ambulated ~6 ft with mod-max A using RW.

## 2019-09-27 NOTE — PLAN OF CARE
"SW met with patient to complete discharge planning assessment. Prior to beginning the discharge planning assessment, patient verified name and date of birth. SW educated patient on the discharge process and explained that discharge planning begins at admission. SW reviewed contents of the "[facility] Packet" emphasizing, "Help at home", "Managing your health", and "Your preferences". Patient's daughter stated that she is patient's help at home. JESÚS wrote name and phone number on white communication board.     09/27/19 6511   Discharge Assessment   Assessment Type Discharge Planning Assessment   Confirmed/corrected address and phone number on facesheet? Yes   Assessment information obtained from? Patient   Expected Length of Stay (days) 3   Communicated expected length of stay with patient/caregiver yes   Prior to hospitilization cognitive status: Alert/Oriented   Prior to hospitalization functional status: Completely Dependent   Current cognitive status: Alert/Oriented   Current Functional Status: Completely Dependent   Facility Arrived From: Home   Lives With child(hong), adult  (Daughter)   Able to Return to Prior Arrangements yes   Is patient able to care for self after discharge? No   Who are your caregiver(s) and their phone number(s)? Etta (Daughter) [phone]   Patient's perception of discharge disposition home or selfcare   Readmission Within the Last 30 Days no previous admission in last 30 days   Patient currently being followed by outpatient case management? No   Patient currently receives any other outside agency services? No   Equipment Currently Used at Home none   Do you have any problems affording any of your prescribed medications? No   Is the patient taking medications as prescribed? no   If no, which medications is patient not taking? Patient is not prescribed any medication.    Does the patient have transportation home? Yes   Transportation Anticipated family or friend will provide   Does the " patient receive services at the [facility]? No   Discharge Plan A Skilled Nursing Facility   Discharge Plan B Home;Home Health   DME Needed Upon Discharge  other (see comments)  (TBD)   Patient/Family in Agreement with Plan yes   Does the patient have transportation to healthcare appointments? Yes     [facility] [place] - [address]  [address]  [place] [address]  Phone: [phone] Fax: [phone]    Ladan Madera MD

## 2019-09-28 VITALS
WEIGHT: 76.5 LBS | RESPIRATION RATE: 18 BRPM | OXYGEN SATURATION: 98 % | BODY MASS INDEX: 15.42 KG/M2 | HEIGHT: 59 IN | SYSTOLIC BLOOD PRESSURE: 107 MMHG | DIASTOLIC BLOOD PRESSURE: 60 MMHG | HEART RATE: 103 BPM | TEMPERATURE: 99 F

## 2019-09-28 PROCEDURE — S4991 NICOTINE PATCH NONLEGEND: HCPCS | Performed by: ORTHOPAEDIC SURGERY

## 2019-09-28 PROCEDURE — G8988 SELF CARE GOAL STATUS: HCPCS | Mod: CJ | Performed by: SPECIALIST

## 2019-09-28 PROCEDURE — 97530 THERAPEUTIC ACTIVITIES: CPT | Performed by: SPECIALIST

## 2019-09-28 PROCEDURE — 25000003 PHARM REV CODE 250: Performed by: ORTHOPAEDIC SURGERY

## 2019-09-28 PROCEDURE — 97530 THERAPEUTIC ACTIVITIES: CPT

## 2019-09-28 PROCEDURE — 86580 TB INTRADERMAL TEST: CPT | Performed by: HOSPITALIST

## 2019-09-28 PROCEDURE — 30200315 PPD INTRADERMAL TEST REV CODE 302: Performed by: HOSPITALIST

## 2019-09-28 PROCEDURE — 97116 GAIT TRAINING THERAPY: CPT

## 2019-09-28 PROCEDURE — G8987 SELF CARE CURRENT STATUS: HCPCS | Mod: CK | Performed by: SPECIALIST

## 2019-09-28 RX ORDER — AMOXICILLIN 250 MG
1 CAPSULE ORAL 2 TIMES DAILY
COMMUNITY
Start: 2019-09-28

## 2019-09-28 RX ORDER — OXYCODONE AND ACETAMINOPHEN 5; 325 MG/1; MG/1
1 TABLET ORAL EVERY 8 HOURS PRN
Qty: 10 TABLET | Refills: 0 | Status: SHIPPED | OUTPATIENT
Start: 2019-09-28

## 2019-09-28 RX ADMIN — MUPIROCIN 1 G: 20 OINTMENT TOPICAL at 09:09

## 2019-09-28 RX ADMIN — ACETAMINOPHEN 500 MG: 500 TABLET ORAL at 11:09

## 2019-09-28 RX ADMIN — NICOTINE 1 PATCH: 21 PATCH, EXTENDED RELEASE TRANSDERMAL at 09:09

## 2019-09-28 RX ADMIN — SENNOSIDES, DOCUSATE SODIUM 1 TABLET: 50; 8.6 TABLET, FILM COATED ORAL at 08:09

## 2019-09-28 RX ADMIN — TUBERCULIN PURIFIED PROTEIN DERIVATIVE 5 UNITS: 5 INJECTION, SOLUTION INTRADERMAL at 06:09

## 2019-09-28 NOTE — PLAN OF CARE
09/28/19 1647   Final Note   Assessment Type Final Discharge Note   Anticipated Discharge Disposition Home-Health   Hospital Follow Up  Appt(s) scheduled? No   Discharge plans and expectations educations in teach back method with documentation complete? No       SW informed Nurse Lauryn pt was cleared from case management.     [employer] will deliver equipment today or tomorrow.

## 2019-09-28 NOTE — PT/OT/SLP PROGRESS
"Occupational Therapy   Treatment    Name: Lakisha Padilla  MRN: [id number]  Admitting Diagnosis:  Closed intertrochanteric fracture of right hip  2 Days Post-Op    Recommendations:     Discharge Recommendations: nursing facility, skilled  Discharge Equipment Recommendations:  commode, tub bench, walker, rolling, wheelchair, manual  Barriers to discharge:  Other (Comment)(Pt will need 24 hr assist for self care and mobility needs)    Assessment:     Lakisha Padilla is a 87 y.o. female with a medical diagnosis of Closed intertrochanteric fracture of right hip.  She presents with improved functional mobility today and less pain, but still requires Min A for functional mobility and Setup- TA for ADLs. Performance deficits affecting function are weakness, impaired self care skills, impaired balance, decreased coordination, impaired functional mobilty, impaired endurance, decreased safety awareness, decreased ROM, impaired skin, decreased upper extremity function, pain, gait instability, impaired cognition, decreased lower extremity function, orthopedic precautions, edema.     Rehab Prognosis:  Good; patient would benefit from acute skilled OT services to address these deficits and reach maximum level of function.       Plan:     Patient to be seen 6 x/week to address the above listed problems via self-care/home management, therapeutic activities, therapeutic exercises  · Plan of Care Expires: 10/18/19  · Plan of Care Reviewed with: patient    Subjective     Pain/Comfort:  Pain Rating 1: ("a little")  Location - Side 1: Right  Location 1: leg  Pain Addressed 1: Pre-medicate for activity, Reposition, Distraction, Cessation of Activity  Pain Rating Post-Intervention 1: ("a little")    Objective:     Communicated with: RN (Gu[place]) prior to session.  Patient found supine   upon OT entry to room.    General Precautions: Standard, fall   Orthopedic Precautions:RLE weight bearing as tolerated   Braces: N/A     Occupational " Performance:     Bed Mobility:    · Patient completed Supine to Sit with moderate assistance     Functional Mobility/Transfers:  · Patient completed Sit <> Stand Transfer with minimum assistance  with  rolling walker   · Patient completed Bed <> Chair Transfer with minimum assistance with rolling walker  · Functional Mobility: Pt ambulated in pt room and hallway; see PTA note for details.    Activities of Daily Living:  · Upper Body Dressing: minimum assistance yair gown as robe  · Lower Body Dressing: total assistance yair socks      AMPAC 6 Click ADL: 15    Treatment & Education:  Pt performed B UE AROM: B shoulder and elbow f/e: 10 reps.    Pt's daughter present for beginning of session, but left before formal training for functional mobility and self care could be taught.    Patient left up in chair with all lines intact, call button in reach and RN (Guanaco) notifiedEducation:      GOALS:   Multidisciplinary Problems     Occupational Therapy Goals        Problem: Occupational Therapy Goal    Goal Priority Disciplines Outcome Interventions   Occupational Therapy Goal     OT, PT/OT Ongoing, Progressing    Description:  Goals to be met by: 10/18/19     Patient will increase functional independence with ADLs by performing:    UE Dressing with Stand-by Assistance.  LE Dressing with Moderate Assistance.  Grooming while seated with Stand-by Assistance.  Toileting from bedside commode with Minimal Assistance for hygiene and clothing management.   Sitting at edge of bed x10 minutes with Stand-by Assistance.  Rolling to Bilateral with Minimal Assistance.   Supine to sit with Minimal Assistance.  Step transfer with Minimal Assistance  Toilet transfer to bedside commode with Moderate Assistance.  Upper extremity exercise program x10 reps per handout, with assistance as needed.                      Time Tracking:     OT Date of Treatment: 09/28/19  OT Start Time: 1259  OT Stop Time: 1325  OT Total Time (min): 26 min (Co  tx with PTA)    Billable Minutes:Therapeutic Activity 13    Svetlana Snow OT  9/28/2019

## 2019-09-28 NOTE — DISCHARGE SUMMARY
[facility] Hospital Medicine  Discharge Summary      Patient Name: Lakisha Padilla  MRN: [id number]  Admission Date: 9/25/2019  Hospital Length of Stay: 3 days  Discharge Date and Time:  09/28/2019 4:06 PM  Attending Physician: Celsa Mccray MD   Discharging Provider: Celsa Mccray MD  Primary Care Provider: Ladan Madera MD      HPI:   Ms. Lakisha Padilla is a 87 y.o. female with dementia and tobacco abuse who presents to [facility] ED with complaints of fall today.  The fall was not witnessed but her daughter heard the patient fall.  She was on the ground upon arrival but has not lost any consciousness or sustain any head injury.  EMS was activated and transferred to [facility] ED.  She was then transferred to this ED because of the long wait time.  Further history is otherwise limited at this time.    Procedure(s) (LRB):  ORIF, FRACTURE, FEMUR, INTERTROCHANTERIC (Right)      Hospital Course:   Ms. Padilla presented after sustaining a fall at home.  Imaging remarkable for closed intertrochanteric fracture of the right hip.  Ortho consulted.  Patient underwent right hip IM nailing for intertrochanteric hip fracture on 9/26/19.  PT and OT consulted with recommendation for SN.  Social service working on placement.    Family refuse for patient to go to SNF. They want HH. SW notified. Home equipment ordered. Follow up ortho surgery as scheduled.    DC home.      Consults:   Consults (From admission, onward)        Status Ordering Provider     Consult to Orthopedic Surgery  Once     Provider:  Michael Soler MD    Completed RUBY BERRIOS     Inpatient consult to Social Work/Case Management  Once     Provider:  (Not yet assigned)    Acknowledged DANAE FRAGOSO          No new Assessment & Plan notes have been filed under this hospital service since the last note was generated.  Service: Hospital Medicine    Final Active Diagnoses:    Diagnosis Date Noted POA    PRINCIPAL PROBLEM:   "Closed intertrochanteric fracture of right hip [S72.141A] 09/25/2019 Yes    Tobacco abuse [Z72.0] 09/25/2019 Yes     Chronic    Dementia [F03.90] 09/10/2012 Yes     Chronic      Problems Resolved During this Admission:       Discharged Condition: good    Disposition: Home-Health Care [facility]    Follow Up:    Patient Instructions:      COMMODE FOR HOME USE     Order Specific Question Answer Comments   Type: Standard    Height: 4' 11" (1.499 m)    Weight: 34.7 kg (76 lb 8 oz)    Does patient have medical equipment at home? none    Length of need (1-99 months): 99      WALKER FOR HOME USE     Order Specific Question Answer Comments   Type of Walker: Adult (5'4"-6'6")    With wheels? Yes    Height: 4' 11" (1.499 m)    Weight: 34.7 kg (76 lb 8 oz)    Length of need (1-99 months): 99    Does patient have medical equipment at home? none    Please check all that apply: Patient is unable to safely ambulate without equipment.    Please check all that apply: Patient's condition impairs ambulation.      WHEELCHAIR FOR HOME USE     Order Specific Question Answer Comments   Hours in W/C per day: 5    Type of Wheelchair: Standard    Size(Width): 18"(STD adult)    Leg Support: STD footrests    Lap Belt: Velcro    Cushion: Basic    Height: 4' 11" (1.499 m)    Weight: 34.7 kg (76 lb 8 oz)    Does patient have medical equipment at home? none    Length of need (1-99 months): 99    Please check all that apply: Caregiver is capable and willing to operate wheelchair safely.    Please check all that apply: Patient's upper body strength is sufficient for propulsion.      TRANSFER TUB BENCH FOR HOME USE     Order Specific Question Answer Comments   Type of Transfer Tub Bench: Unpadded    Height: 4' 11" (1.499 m)    Weight: 34.7 kg (76 lb 8 oz)    Does patient have medical equipment at home? none    Length of need (1-99 months): 99      Diet Cardiac     Notify your health care provider if you experience any of the following:  temperature >100.4 "     Notify your health care provider if you experience any of the following:  severe uncontrolled pain     Notify your health care provider if you experience any of the following:  persistent dizziness, light-headedness, or visual disturbances     Activity as tolerated           Pending Diagnostic Studies:     None         Medications:  Reconciled Home Medications:      Medication List      START taking these medications    senna-docusate 8.6-50 mg 8.6-50 mg per tablet  Commonly known as:  PERICOLACE  Take 1 tablet by mouth 2 (two) times daily.        CONTINUE taking these medications    acetaminophen 500 MG tablet  Commonly known as:  TYLENOL  Take 1 tablet (500 mg total) by mouth every 6 (six) hours as needed for Pain.     aspirin 81 MG Chew  Take 81 mg by mouth once daily.     donepezil 10 MG tablet  Commonly known as:  ARICEPT  Take 10 mg by mouth once daily.     ONE DAILY MULTIVITAMIN per tablet  Generic drug:  multivitamin  Take 1 tablet by mouth once daily.     oxyCODONE-acetaminophen 5-325 mg per tablet  Commonly known as:  PERCOCET  Take 1 tablet by mouth every 8 (eight) hours as needed for Pain (As needed for severe 10 out of 10 pain).            Indwelling Lines/Drains at time of discharge:   Lines/Drains/Airways     None                 Time spent on the discharge of patient: 30 minutes  Patient was seen and examined on the date of discharge and determined to be suitable for discharge.         Celsa Mccray MD  Department of Hospital Medicine  [facility]

## 2019-09-28 NOTE — PLAN OF CARE
Safety precautions maintained and bed locked in lowest position. Side rails up X2. Call bell and personal items within reach. Will continue to monitor pt for any changes.

## 2019-09-28 NOTE — PLAN OF CARE
[facility]    HOME HEALTH ORDERS  FACE TO FACE ENCOUNTER    Patient Name: Lakisha Padilla  YOB: 1931    PCP: Ladan Madera MD   PCP Address: [address] / [place] [address]  PCP Phone Number: [phone]  PCP Fax: [phone]    Encounter Date: 09/28/2019    Admit to Home Health    Diagnoses:  Active Hospital Problems    Diagnosis  POA    *Closed intertrochanteric fracture of right hip [S72.141A]  Yes    Tobacco abuse [Z72.0]  Yes     Chronic    Dementia [F03.90]  Yes     Chronic      Resolved Hospital Problems   No resolved problems to display.       No future appointments.        I have seen and examined this patient face to face today. My clinical findings that support the need for the home health skilled services and home bound status are the following:  Weakness/numbness causing balance and gait disturbance due to Fracture and Weakness/Debility making it taxing to leave home.    Allergies:Review of patient's allergies indicates:  No Known Allergies    Diet: cardiac diet    Activities: activity as tolerated    Nursing:   SN to complete comprehensive assessment including routine vital signs. Instruct on disease process and s/s of complications to report to MD. Review/verify medication list sent home with the patient at time of discharge  and instruct patient/caregiver as needed. Frequency may be adjusted depending on start of care date.    Notify MD if SBP > 160 or < 90; DBP > 90 or < 50; HR > 120 or < 50; Temp > 101; Other:         CONSULTS:    Physical Therapy to evaluate and treat. Evaluate for home safety and equipment needs; Establish/upgrade home exercise program. Perform / instruct on therapeutic exercises, gait training, transfer training, and Range of Motion.  Occupational Therapy to evaluate and treat. Evaluate home environment for safety and equipment needs. Perform/Instruct on transfers, ADL training, ROM, and therapeutic exercises.  Aide to provide  assistance with personal care, ADLs, and vital signs.      Medications: Review discharge medications with patient and family and provide education.      Current Discharge Medication List      START taking these medications    Details   senna-docusate 8.6-50 mg (PERICOLACE) 8.6-50 mg per tablet Take 1 tablet by mouth 2 (two) times daily.         CONTINUE these medications which have CHANGED    Details   oxyCODONE-acetaminophen (PERCOCET) 5-325 mg per tablet Take 1 tablet by mouth every 8 (eight) hours as needed for Pain (As needed for severe 10 out of 10 pain).  Qty: 10 tablet, Refills: 0         CONTINUE these medications which have NOT CHANGED    Details   multivitamin (ONE DAILY MULTIVITAMIN) per tablet Take 1 tablet by mouth once daily.      acetaminophen (TYLENOL) 500 MG tablet Take 1 tablet (500 mg total) by mouth every 6 (six) hours as needed for Pain.  Qty: 30 tablet, Refills: 0      aspirin 81 MG Chew Take 81 mg by mouth once daily.      donepezil (ARICEPT) 10 MG tablet Take 10 mg by mouth once daily.              I certify that this patient is confined to her home and needs intermittent skilled nursing care, physical therapy and occupational therapy.

## 2019-09-28 NOTE — PROGRESS NOTES
[facility] CASE MANAGEMENT                  WRITTEN DISCHARGE INFORMATION      APPOINTMENTS AND RESOURCES TO HELP YOU MANAGE YOUR CARE AT HOME BASED ON YOUR PREFERENCES:  (If an appointment is not scheduled for you when you leave the hospital, call your doctor to schedule a follow up visit within a week)  Follow-up Information     [facility].    Why:  Home Health  Contact information:  [phone]             [facility].    Specialty:  DME Provider  Why:  [facility]  Contact information:  [address] [facility] [address]  [phone]                 Healthy Living Instructions to HELP MANAGE YOUR CARE AT HOME:  Things You are responsible for:  1.    Getting your prescriptions filled   2.    Taking your medications as directed, DO NOT MISS ANY DOSES!  3.    Following the diet and exercise recommended by your doctor  4.    Going to your follow-up doctor appointment. This is important because it allows the doctor to monitor your progress and determine if any changes need to made to your treatment plan.  5. If you have any questions about MANAGING YOUR CARE AT HOME Call the Nurse Care Line for 24/7 Assistance [phone]       Please answer any calls you may receive from [facility]. We want to continue to support you as you manage your healthcare needs. [facility] is happy to have the opportunity to serve you.      Thank you for choosing [facility] for your healthcare needs!  Your [facility] Case Management Team,

## 2019-09-28 NOTE — PT/OT/SLP PROGRESS
"Physical Therapy Treatment    Patient Name:  Lakisha Padilla   MRN:  [id number]    Recommendations:     Discharge Recommendations:  nursing facility, skilled Daughter declined SNF, would like HHPT services (pt will require 24h supervision/assistance upon D/C from the hospital)     Discharge Equipment Recommendations: commode, tub bench, walker, rolling, wheelchair, manual   Barriers to discharge: pt with decreased mobility     Assessment:     Lakisha Padilla is a 87 y.o. female admitted with a medical diagnosis of Closed intertrochanteric fracture of right hip.  She presents with the following impairments/functional limitations:  weakness, impaired endurance, impaired self care skills, gait instability, impaired functional mobilty, impaired balance, decreased upper extremity function, decreased lower extremity function, decreased ROM, edema, orthopedic precautions, pain, decreased coordination, decreased safety awareness, impaired cognition . Pt ambulated ~ 40 ft x 2 trials with RW, CGA/MIN A for balance and MIN /MOD A for walker management  (chair followed). Pt required 1 seated rest break during gait training 2* fatigue . Pt is pleasantly confused , able to re-direct pt for safety and sequence.  Pt will benefit from further skilled therapy in order to return to OF    Rehab Prognosis: Fair +; patient would benefit from acute skilled PT services to address these deficits and reach maximum level of function.    Recent Surgery: Procedure(s) (LRB):  ORIF, FRACTURE, FEMUR, INTERTROCHANTERIC (Right) 2 Days Post-Op    Plan:     During this hospitalization, patient to be seen daily to address the identified rehab impairments via gait training, therapeutic activities, therapeutic exercises and progress toward the following goals:    · Plan of Care Expires:  10/11/19    Subjective     Chief Complaint: tired of being in the bed   Patient/Family Comments/goals: to walk   Pain/Comfort: pt stated " a little " for pain rating "   · Location - Side 1: Right  · Location 1: hip  · Pain Addressed 1: Pre-medicate for activity, Nurse notified      Objective:     Communicated with nurse Blanc prior to session.  Patient found HOB elevated with daughter present upon arrival however daughter did not stay during  treatment  bed alarm upon PT entry to room.     General Precautions: Standard, fall   Orthopedic Precautions:RLE weight bearing as tolerated   Braces: N/A     Functional Mobility:  · Bed Mobility:     · Rolling Right: contact guard assistance and minimum assistance  · Scooting: contact guard assistance  · Supine to Sit: moderate assistance, HOB elevated, bedside rail   · Transfers:     · Sit to Stand: from bed and chair minimum assistance and moderate assistance with rolling walker. V/T cues for safety technique and walker management.   · Gait: Patient ambulated ~ 40 ft x 2  on level tile with Rolling Walker with Minimal Assistance/CGA for balance however required MIN/MOD A for walker management (chair followed) . Pt required 1 seated rest break during gait training 2* fatigue.  Pt with demonstarting a  3-point gait with decreased ishan, increased time in double stance, decreased velocity of limb motion, decreased step length, decreased swing-to-stance ratio, decreased toe-to-floor clearance and decreased weight-shifting ability.Impairments contributing to gait deviations include impaired balance, decreased flexibility, pain, impaired postural control, decreased ROM and decreased strength. V/T cues for safety technique and walker management.    · Balance: fair-       AM-PAC 6 CLICK MOBILITY  Turning over in bed (including adjusting bedclothes, sheets and blankets)?: 4  Sitting down on and standing up from a chair with arms (e.g., wheelchair, bedside commode, etc.): 3  Moving from lying on back to sitting on the side of the bed?: 2  Moving to and from a bed to a chair (including a wheelchair)?: 3  Need to walk in hospital room?:  3  Climbing 3-5 steps with a railing?: 2  Basic Mobility Total Score: 17       Therapeutic Activities and Exercises:   pt performed seated BLE x 10 reps : AP, LAQ, HS.   Per chart , family wants to take [atient home , but daughter did not stay during treatment. Unable to provide family training.     Patient left up in RECLINED chair on air cushion  with all lines intact, call button in reach, NURSE notified and [employer] present..    GOALS:   Multidisciplinary Problems     Physical Therapy Goals        Problem: Physical Therapy Goal    Goal Priority Disciplines Outcome Goal Variances Interventions   Physical Therapy Goal     PT, PT/OT Ongoing, Progressing     Description:  Goals to be met by: 10/11/19     Patient will increase functional independence with mobility by performin. Supine to sit with MInimal Assistance  2. Rolling to Left and Right with Minimal Assistance  3. Sit to stand transfer with Minimal Assistance using RW  4. Bed to chair transfer with Minimal Assistance using Rolling Walker  5. Gait  x50 feet with Minimal Assistance using Rolling Walker  6. Lower extremity exercise program 2 sets x10 reps per handout, with assistance as needed                      Time Tracking:     PT Received On: 19  PT Start Time: 1259     PT Stop Time: 1325  PT Total Time (min): 26 min     Billable Minutes: Gait Training 13 and Total Time 26 co-treat with OT    Treatment Type: Treatment  PT/PTA: PTA     PTA Visit Number: 1   1440 PM-1452 PM : 12 MIN ,1 TA  Pt 's daughter requested an update on pt progress. Educated and demonstrated pt's daughter on safety, proper technique and walker management for all transfer (included car transfer) and gait training. BLE ex's handout given with instruction and demonstration , pt's daughter verbalized understanding. Informed daughter that pt will need 24 h assistance/supervision if  d/c from the hospital 2* pt decreased cognition and mobility , pt daughter stated that she and  her family able to provide 24 h care. 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  Daughter stated that she will be able to care for pt.  Daughter educated on DME's for pt.  Daughter verbalized good understanding. Daughter denied any further needs from PT services.       Aster Ponce, PTA  09/28/2019

## 2019-09-28 NOTE — PLAN OF CARE
Problem: Physical Therapy Goal  Goal: Physical Therapy Goal  Description  Goals to be met by: 10/11/19     Patient will increase functional independence with mobility by performin. Supine to sit with MInimal Assistance  2. Rolling to Left and Right with Minimal Assistance  3. Sit to stand transfer with Minimal Assistance using RW  4. Bed to chair transfer with Minimal Assistance using Rolling Walker  5. Gait  x50 feet with Minimal Assistance using Rolling Walker  6. Lower extremity exercise program 2 sets x10 reps per handout, with assistance as needed     Outcome: Ongoing, Progressing   Pt ambulated ~ 40 ft x 2 trials with RW, CGA/MIN A for balance and MIN /MOD A for walker management  (chair followed). Pt required 1 seated rest break during gait training 2* fatigue . Pt is pleasantly confused , able to re-direct pt for safety and sequence.  Pt will benefit from further skilled therapy in order to return to PLOF.

## 2019-09-28 NOTE — PROGRESS NOTES
Patient is in bed doing well this morning.  She reports she got out of bed and walk and sat in a chair yesterday on 1st postop day.  She is waiting for therapy today.      Temp:  [97.5 °F (36.4 °C)-99.2 °F (37.3 °C)] 97.7 °F (36.5 °C)  Pulse:  [] 100  Resp:  [18-20] 18  SpO2:  [92 %-100 %] 100 %  BP: (105-130)/(49-92) 105/49        PE:    Right thigh dressings are clean and dry.  She has minimal swelling present.  She is able to flex extend her foot nicely.      No results for input(s): WBC, RBC, HGB, HCT, PLT, MCV, MCH, MCHC in the last 24 hours.      Current Facility-Administered Medications:     acetaminophen tablet 500 mg, 500 mg, Oral, Q6H PRN, Del Duarte MD, 500 mg at 09/28/19 1155    enoxaparin injection 30 mg, 30 mg, Subcutaneous, Daily, Del Duarte MD, 30 mg at 09/27/19 1822    mupirocin 2 % ointment 1 g, 1 g, Nasal, BID, Del Duarte MD, 1 g at 09/28/19 0900    nicotine 21 mg/24 hr 1 patch, 1 patch, Transdermal, Daily, Del Duarte MD, 1 patch at 09/28/19 0900    ondansetron injection 4 mg, 4 mg, Intravenous, Q12H PRN, Del Duarte MD    senna-docusate 8.6-50 mg per tablet 1 tablet, 1 tablet, Oral, BID, Del Duarte MD, 1 tablet at 09/28/19 0854      A/P:    Right hip intertrochanteric fracture status post IM nailing 9/26    PTOT-weight-bearing as tolerated    DVT prophylaxis-Lovenox    CBC in a.m.    Discharge planning-family interested in home health

## 2019-09-28 NOTE — PLAN OF CARE
Problem: Occupational Therapy Goal  Goal: Occupational Therapy Goal  Description  Goals to be met by: 10/18/19     Patient will increase functional independence with ADLs by performing:    UE Dressing with Stand-by Assistance.  LE Dressing with Moderate Assistance.  Grooming while seated with Stand-by Assistance.  Toileting from bedside commode with Minimal Assistance for hygiene and clothing management.   Sitting at edge of bed x10 minutes with Stand-by Assistance.  Rolling to Bilateral with Minimal Assistance.   Supine to sit with Minimal Assistance.  Step transfer with Minimal Assistance  Toilet transfer to bedside commode with Moderate Assistance.  Upper extremity exercise program x10 reps per handout, with assistance as needed.     Outcome: Ongoing, Progressing  Pt with improved functional mobility and less pain.  Con't to recommend SNF (Per chart, family wants to take pt home, but daughter did not stay during tx for training).

## 2019-09-28 NOTE — PLAN OF CARE
Monitored freq.throughout the shift. Pt.resting at present with no distress noted. Cardiac monitor d/c earlier. Med.for pain as needed & med.helpful. Tolerated out of bed with PT/OT well earlier. Cont.with dementia-oriented to self. Telesitter in use & bed alarm activated. Brittany drsgr. To rt.lat thigh/hip area intact & dry.Call light in reach.

## 2019-09-28 NOTE — NURSING
Pt given written and verbal discharge instructions. IV access removed. NAD or pain noted. Prescription for Oxycodone given to pt daughter in hand. Pt daughter verbalized understanding of at home care and follow up appts.Pt escorted off unit by transport with family.

## 2019-09-30 PROCEDURE — G0180 PR HOME HEALTH MD CERTIFICATION: ICD-10-PCS | Mod: ,,, | Performed by: HOSPITALIST

## 2019-09-30 PROCEDURE — G0180 MD CERTIFICATION HHA PATIENT: HCPCS | Mod: ,,, | Performed by: HOSPITALIST

## 2019-09-30 NOTE — ANESTHESIA POSTPROCEDURE EVALUATION
Anesthesia Post Evaluation    Patient: Lakisha Padilla    Procedure(s) Performed: Procedure(s) (LRB):  ORIF, FRACTURE, FEMUR, INTERTROCHANTERIC (Right)    Final Anesthesia Type: spinal  Patient location during evaluation: PACU  Patient participation: Yes- Able to Participate  Level of consciousness: awake and alert  Post-procedure vital signs: reviewed and stable  Pain management: adequate  Airway patency: patent  PONV status at discharge: No PONV  Anesthetic complications: no      Cardiovascular status: blood pressure returned to baseline and hemodynamically stable  Respiratory status: unassisted and spontaneous ventilation  Hydration status: euvolemic  Follow-up not needed.          Vitals Value Taken Time   /60 9/28/2019  4:31 PM   Temp 37 °C (98.6 °F) 9/28/2019  4:31 PM   Pulse 103 9/28/2019  4:31 PM   Resp 18 9/28/2019  4:31 PM   SpO2 98 % 9/28/2019  4:31 PM         Event Time     Out of Recovery 09/26/2019 17:25:52          Pain/Martha Score: No data recorded

## 2019-09-30 NOTE — PT/OT/SLP DISCHARGE
Occupational Therapy Discharge Summary    Lakisha Padilla  MRN: [id number]   Principal Problem: Closed intertrochanteric fracture of right hip      Patient Discharged from acute Occupational Therapy on 09/28/19.  Please refer to prior OT notes for functional status.    Assessment:      Patient appropriate for care in another setting.    Objective:     GOALS:   Multidisciplinary Problems     Occupational Therapy Goals        Problem: Occupational Therapy Goal    Goal Priority Disciplines Outcome Interventions   Occupational Therapy Goal     OT, PT/OT Ongoing, Progressing    Description:  Goals to be met by: 10/18/19     Patient will increase functional independence with ADLs by performing:    UE Dressing with Stand-by Assistance.  LE Dressing with Moderate Assistance.  Grooming while seated with Stand-by Assistance.  Toileting from bedside commode with Minimal Assistance for hygiene and clothing management.   Sitting at edge of bed x10 minutes with Stand-by Assistance.  Rolling to Bilateral with Minimal Assistance.   Supine to sit with Minimal Assistance.  Step transfer with Minimal Assistance  Toilet transfer to bedside commode with Moderate Assistance.  Upper extremity exercise program x10 reps per handout, with assistance as needed.                      Reasons for Discontinuation of Therapy Services  Transfer to alternate level of care.      Plan:     Patient Discharged to: OT rec. SNF; pt d/c home with HHT    Ablania Roberto OT  9/30/2019

## 2019-09-30 NOTE — PT/OT/SLP DISCHARGE
Physical Therapy Discharge Summary    Name: Lakisha Padilla  MRN: [id number]   Principal Problem: Closed intertrochanteric fracture of right hip     Patient Discharged from acute Physical Therapy on 19.  Please refer to prior PT notes for functional status.     Assessment:     Patient appropriate for care in another setting.    Objective:     GOALS:   Multidisciplinary Problems     Physical Therapy Goals        Problem: Physical Therapy Goal    Goal Priority Disciplines Outcome Goal Variances Interventions   Physical Therapy Goal     PT, PT/OT Ongoing, Progressing     Description:  Goals to be met by: 10/11/19     Patient will increase functional independence with mobility by performin. Supine to sit with MInimal Assistance  2. Rolling to Left and Right with Minimal Assistance  3. Sit to stand transfer with Minimal Assistance using RW  4. Bed to chair transfer with Minimal Assistance using Rolling Walker  5. Gait  x50 feet with Minimal Assistance using Rolling Walker  6. Lower extremity exercise program 2 sets x10 reps per handout, with assistance as needed                      Reasons for Discontinuation of Therapy Services  Transfer to alternate level of care.      Plan:     Patient Discharged to: Home with Home Health Service; family declined SNF.    Janae Lockwood, PT  2019

## 2019-10-01 ENCOUNTER — PATIENT OUTREACH (OUTPATIENT)
Dept: ADMINISTRATIVE | Facility: CLINIC | Age: 84
End: 2019-10-01

## 2019-10-01 RX ORDER — CHOLECALCIFEROL (VITAMIN D3) 25 MCG
1000 TABLET ORAL
COMMUNITY

## 2019-10-01 NOTE — PATIENT INSTRUCTIONS
Discharge Instructions for Hip Fracture Surgery  You had surgery to repair a hip fracture. The type of surgery you received depends on the location and severity of the fracture. You may have pins, screws, or rods (internal fixation devices) holding the fractured bone in place. Or some or all of your hip may have been replaced. You must take care of your new hip as you recover at home or in a rehabilitation facility. This means moving and sitting the way you were taught in the hospital. You must also see your doctor for follow-up visits as you slowly return to activity.  Hip repair for fracture or hip replacement is major surgery. So dont be surprised if it takes a few months before you can move comfortably. Plan to have your family and friends help when you return home.  Home care  · Take your pain medicine exactly as directed.  · Dont drive until your doctor says its OK. And never drive if you are taking opioid pain medicine.  · Wear the support stockings you were given in the hospital. Wear them 24 hours a day for 3 to 4 week(s).  · Make arrangements to have your staples removed 2 weeks after surgery. The staples were used to close the skin incision.  · Get up and carefully move around to relieve pain.  · If you received an artificial hip joint, tell all your healthcare providers--including your dentist--about the joint before any procedure. You may need to take antibiotics before dental work and other medical procedures to reduce the risk of infection.  Incision care  · Avoid infection by washing your hands often. If an infection occurs, it will need to be treated with antibiotics immediately. So call your doctor right away if you think you may have an infection. Symptoms of infection include a fever or leakage of white, greenish, or yellowish-colored fluid from the incision.  · Check your incision daily for redness, tenderness, or drainage.  · Avoid soaking your wound in water (no hot tubs, bathtubs,  swimming pools) until your doctor says its OK.  · Wait 7 day(s) after your surgery to begin showering. Then shower as needed. Carefully wash your incision with soap and water. Gently pat it dry. Dont rub the incision, or apply creams or lotions. And sit on a shower stool when you shower to keep from falling.  Sitting and sleeping  · Dont sit for more than 30 to 45 minutes at a time.  · Use chairs with arms, and sit with your knees slightly lower than your hips. Dont sit on low or sagging chairs or couches.  · Dont lean forward while sitting.  · Dont cross your legs.  · Keep your feet flat on the floor. Dont turn your foot or leg inward. This stresses your hip joint.  · Use an elevated toilet seat for 6 week(s) after surgery.  · Use pillows between your legs when sleeping on your back or on your healthy side.  · Sit on a firm cushion when you ride in a car and avoid sitting too low. Try not to bend your hip too much when getting in and out of the car.  Moving safely  · Dont bend at the hip when you bend over. Dont bend at the waist to put on socks and shoes. And avoid picking up items from the floor.  · Use a cane, crutches, a walker, or handrails until your balance, flexibility, and strength improve. And remember to ask for help from others when you need it.  · Free up your hands so that you can use them to keep balance. Use a ludwig pack, apron, or pockets to carry things.  · Follow your doctors orders regarding how much weight to place on the affected leg.  · Do all exercises as instructed.  · Arrange your household to keep the items you need within reach.  · Remove electrical cords, throw rugs, and anything else that may cause you to fall.  · Use nonslip bath mats, grab bars, an elevated toilet seat, and a shower chair in your bathroom.  Follow-up  Make a follow-up appointment as directed by your doctor.     Call 911  Call 911 right away if you have any of the following:  · Chest pain  · Shortness of  breath  When to call your doctor  Call your doctor right away if you have any of the following:  · Hip pain gets worse  · Pain or swelling of your calf or leg not related to your incision  · Tenderness or redness in your calf  · Fever of 100.4°F  (38.0°C) or higher, or as directed by your healthcare provider  · Shaking chills  · Swelling or redness at the incision site gets worse  · Fluid draining from the incision   Date Last Reviewed: 11/15/2015  © 5051-1185 [employer]. [address] [place] [id number]. All rights reserved. This information is not intended as a substitute for professional medical care. Always follow your healthcare professional's instructions.

## 2019-10-14 ENCOUNTER — EXTERNAL HOME HEALTH (OUTPATIENT)
Dept: HOME HEALTH SERVICES | Facility: HOSPITAL | Age: 84
End: 2019-10-14
Payer: MEDICARE

## 2019-11-19 ENCOUNTER — TELEPHONE (OUTPATIENT)
Dept: HOME HEALTH SERVICES | Facility: HOSPITAL | Age: 84
End: 2019-11-19

## 2020-02-18 ENCOUNTER — TELEPHONE (OUTPATIENT)
Dept: HOME HEALTH SERVICES | Facility: HOSPITAL | Age: 85
End: 2020-02-18

## (undated) DEVICE — SEE MEDLINE ITEM 146345

## (undated) DEVICE — REAMER MOD BIXCUT 8X48MM STER.

## (undated) DEVICE — APPLICATOR CHLORAPREP ORN 26ML

## (undated) DEVICE — SUPPORT ULNA NERVE PROTECTOR

## (undated) DEVICE — BLADE SURG CARBON STEEL #10

## (undated) DEVICE — CLIPPER BLADE MOD 4406 (CAREF)

## (undated) DEVICE — SEE MEDLINE ITEM 152622

## (undated) DEVICE — GUIDE WIRE 3.0X1000MM BALL TIP
Type: IMPLANTABLE DEVICE | Site: HIP | Status: NON-FUNCTIONAL
Removed: 2019-09-26

## (undated) DEVICE — DRESSING AQUACEL AG ADV 3.5X6

## (undated) DEVICE — CANISTER SUCTION 2 LTR

## (undated) DEVICE — GLOVE SURGICAL LATEX SZ 6.5

## (undated) DEVICE — BIT DRILL AO 5.0X230MM STERILE

## (undated) DEVICE — DRESSING COVER AQUACEL AG SURG

## (undated) DEVICE — SEE MEDLINE ITEM 107746

## (undated) DEVICE — ELECTRODE REM PLYHSV RETURN 9

## (undated) DEVICE — PAD ABD 8X10 STERILE

## (undated) DEVICE — GLOVE SURGICAL LATEX SZ 8

## (undated) DEVICE — SOL 9P NACL IRR PIC IL

## (undated) DEVICE — WIRE KIRSCHNER T2 3X285MM SS
Type: IMPLANTABLE DEVICE | Site: HIP | Status: NON-FUNCTIONAL
Removed: 2019-09-26

## (undated) DEVICE — DRAPE C-ARM FOR MOBILE XRAY

## (undated) DEVICE — Device

## (undated) DEVICE — BLANKET UPPER BODY 78.7X29.9IN

## (undated) DEVICE — STAPLER SKIN PROXIMATE WIDE

## (undated) DEVICE — UNDERGLOVE BIOGEL PI SZ 6.5 LF

## (undated) DEVICE — BIT DRILL T2 4.2 X 180MM L

## (undated) DEVICE — GLOVE BIOGEL ORTHOPEDIC 8

## (undated) DEVICE — GOWN XX-LARGE

## (undated) DEVICE — PAD TRACTION BOOT

## (undated) DEVICE — KIT PT CARE HANA PROFX SSXT

## (undated) DEVICE — SEE MEDLINE ITEM 146292

## (undated) DEVICE — SUT VICRYL 2-0 36 CT-1

## (undated) DEVICE — SEE MEDLINE ITEM 157117

## (undated) DEVICE — GUIDEWIRE 3.2 X 450
Type: IMPLANTABLE DEVICE | Site: HIP | Status: NON-FUNCTIONAL
Removed: 2019-09-26

## (undated) DEVICE — DRAPE STERI-DRAPE 83X125 IOBAN